# Patient Record
Sex: FEMALE | Race: ASIAN | NOT HISPANIC OR LATINO
[De-identification: names, ages, dates, MRNs, and addresses within clinical notes are randomized per-mention and may not be internally consistent; named-entity substitution may affect disease eponyms.]

---

## 2023-02-15 ENCOUNTER — APPOINTMENT (OUTPATIENT)
Dept: UROLOGY | Facility: CLINIC | Age: 83
End: 2023-02-15
Payer: MEDICARE

## 2023-02-15 VITALS
BODY MASS INDEX: 27.71 KG/M2 | WEIGHT: 132 LBS | HEIGHT: 58 IN | OXYGEN SATURATION: 97 % | TEMPERATURE: 98.1 F | DIASTOLIC BLOOD PRESSURE: 85 MMHG | SYSTOLIC BLOOD PRESSURE: 144 MMHG | HEART RATE: 97 BPM

## 2023-02-15 DIAGNOSIS — Z78.9 OTHER SPECIFIED HEALTH STATUS: ICD-10-CM

## 2023-02-15 DIAGNOSIS — Z87.440 PERSONAL HISTORY OF URINARY (TRACT) INFECTIONS: ICD-10-CM

## 2023-02-15 DIAGNOSIS — R73.03 PREDIABETES.: ICD-10-CM

## 2023-02-15 PROBLEM — Z00.00 ENCOUNTER FOR PREVENTIVE HEALTH EXAMINATION: Status: ACTIVE | Noted: 2023-02-15

## 2023-02-15 PROCEDURE — 99204 OFFICE O/P NEW MOD 45 MIN: CPT

## 2023-02-15 RX ORDER — METOPROLOL TARTRATE 25 MG/1
25 TABLET, FILM COATED ORAL
Refills: 0 | Status: ACTIVE | COMMUNITY

## 2023-02-15 RX ORDER — MECLIZINE HCL 25 MG/1
25 TABLET ORAL
Refills: 0 | Status: ACTIVE | COMMUNITY

## 2023-02-15 RX ORDER — FAMOTIDINE 20 MG/1
20 TABLET, FILM COATED ORAL
Refills: 0 | Status: ACTIVE | COMMUNITY

## 2023-02-15 RX ORDER — LOSARTAN POTASSIUM 100 MG/1
100 TABLET, FILM COATED ORAL
Refills: 0 | Status: ACTIVE | COMMUNITY

## 2023-02-15 NOTE — ASSESSMENT
[FreeTextEntry1] : Ms. Flores is a very pleasant 82 year old woman here today for microscopic hematuria in the setting of a urinary tract infection, urinary urgency and recurrent UTI.\par Primarily Omani speaking; accompanied by family members and wishes for her to translate.\par She reports that a few months ago she went to her PCP for urinary urgency, especially at night.\par Reports that her PCP reported that she had a UTI and prescribed Macrobid.\par Reports that she finished taking it and reported her symptoms returned.\par Reports the PCP re-prescribed Macrobid, and it helped until she stopped taking it.\par This has happened 3 times.\par She reports urgency, frequency and bloating.\par Denies any dysuria or gross hematuria.\par Renal US was done and it was unremarkable with no kidney stones, hydronephrosis or masses.\par Reports low water intake due to her frequency she is nervous about running to the bathroom.\par Denies any personal or family history of kidney stones.\par Denies any family history of urological cancers.\par Denies any history of smoking.\par UC.\par UA.\par Bactrim BID 7 days given concern for urinary tract infection at this time\par We discussed general preventative strategies for urinary tract infections\par Renal ultrasound images from [facility] radiology reviewed demonstrating no kidney stones, hydronephrosis, renal masses\par RTO in 2 weeks.\par

## 2023-02-15 NOTE — HISTORY OF PRESENT ILLNESS
Pt having formed red/brown stools x2 .  MD informed.    [Currently Experiencing ___] :  [unfilled] [Urinary Urgency] : urinary urgency [Urinary Frequency] : urinary frequency [None] : None [FreeTextEntry1] : Ms. Flores is a very pleasant 82 year old woman here today for microscopic hematuria, urinary urgency and recurrent UTI.\par Primarily Cameroonian speaking; accompanied by family members and wishes for her to translate.\par She reports that a few months ago she went to her PCP for urinary urgency, especially at night.\par Reports that her PCP reported that she had a UTI and prescribed Macrobid.\par Reports that she finished taking it and reported her symptoms returned.\par Reports the PCP re-prescribed Macrobid, and it helped until she stopped taking it.\par This has happened 3 times.\par She reports urgency, frequency and bloating.\par Denies any dysuria or gross hematuria.\par Renal US was done and it was unremarkable with no kidney stones, hydronephrosis or masses.\par Reports low water intake due to her frequency she is nervous about running to the bathroom.\par Denies any personal or family history of kidney stones.\par Denies any family history of urological cancers.\par Denies any history of smoking.\par \par

## 2023-02-23 ENCOUNTER — NON-APPOINTMENT (OUTPATIENT)
Age: 83
End: 2023-02-23

## 2023-03-10 ENCOUNTER — APPOINTMENT (OUTPATIENT)
Dept: UROLOGY | Facility: CLINIC | Age: 83
End: 2023-03-10
Payer: MEDICARE

## 2023-03-10 VITALS
HEART RATE: 111 BPM | SYSTOLIC BLOOD PRESSURE: 147 MMHG | TEMPERATURE: 97.9 F | BODY MASS INDEX: 27.71 KG/M2 | WEIGHT: 132 LBS | DIASTOLIC BLOOD PRESSURE: 76 MMHG | OXYGEN SATURATION: 97 % | HEIGHT: 58 IN

## 2023-03-10 PROCEDURE — 99214 OFFICE O/P EST MOD 30 MIN: CPT

## 2023-03-10 NOTE — ASSESSMENT
[FreeTextEntry1] : Very pleasant 82-year-old woman who presents for follow-up of microscopic hematuria, recurrent urinary tract infections\par -Urine culture demonstrates a urinary tract infection sensitive to Bactrim which she completed\par -Urinalysis demonstrates 60 red blood cells per high-powered field as well as concern for urinary tract infection\par -Repeat urinalysis and culture today\par -CT urogram for work-up of microscopic hematuria\par -Cytology\par -Cystoscopy\par -We discussed AUA guidelines regarding risk stratification for microscopic hematuria\par -Extensive discussion of the potential etiologies of hematuria, as well as the need to complete full work up for evaluation of cancer or other  sources of hematuria.  Patient understands and wishes to proceed.\par -Follow-up in 2 weeks\par -Continue cranberry supplements

## 2023-03-10 NOTE — HISTORY OF PRESENT ILLNESS
[FreeTextEntry1] : Very pleasant 82 year old woman who presents for follow up of recurrent UTIs and microscopic hematuria. Recent UA demonstrated 60 red blood cells per high-powered field.  Urine culture demonstrated a urinary tract infection.  She was treated with 7 days of Bactrim.  She reports dysuria resolved.  She still reports suprapubic pressure.  No other complaints.

## 2023-04-04 ENCOUNTER — APPOINTMENT (OUTPATIENT)
Dept: UROLOGY | Facility: CLINIC | Age: 83
End: 2023-04-04
Payer: MEDICARE

## 2023-04-04 PROCEDURE — 99214 OFFICE O/P EST MOD 30 MIN: CPT | Mod: 25

## 2023-04-04 PROCEDURE — 52000 CYSTOURETHROSCOPY: CPT

## 2023-04-05 ENCOUNTER — EMERGENCY (EMERGENCY)
Facility: HOSPITAL | Age: 83
LOS: 1 days | Discharge: HOME | End: 2023-04-05
Attending: STUDENT IN AN ORGANIZED HEALTH CARE EDUCATION/TRAINING PROGRAM
Payer: MEDICARE

## 2023-04-05 VITALS
OXYGEN SATURATION: 98 % | WEIGHT: 130.07 LBS | RESPIRATION RATE: 16 BRPM | HEART RATE: 81 BPM | SYSTOLIC BLOOD PRESSURE: 165 MMHG | DIASTOLIC BLOOD PRESSURE: 83 MMHG | TEMPERATURE: 98 F

## 2023-04-05 VITALS
HEART RATE: 74 BPM | OXYGEN SATURATION: 98 % | RESPIRATION RATE: 18 BRPM | DIASTOLIC BLOOD PRESSURE: 74 MMHG | SYSTOLIC BLOOD PRESSURE: 141 MMHG | TEMPERATURE: 98 F

## 2023-04-05 LAB
ALBUMIN SERPL ELPH-MCNC: 3.4 G/DL — LOW (ref 3.5–5)
ALP SERPL-CCNC: 48 U/L — SIGNIFICANT CHANGE UP (ref 40–120)
ALT FLD-CCNC: 12 U/L DA — SIGNIFICANT CHANGE UP (ref 10–60)
ANION GAP SERPL CALC-SCNC: 6 MMOL/L — SIGNIFICANT CHANGE UP (ref 5–17)
APPEARANCE UR: ABNORMAL
APTT BLD: 30.3 SEC — SIGNIFICANT CHANGE UP (ref 27.5–35.5)
AST SERPL-CCNC: 14 U/L — SIGNIFICANT CHANGE UP (ref 10–40)
BACTERIA # UR AUTO: ABNORMAL /HPF
BASOPHILS # BLD AUTO: 0.05 K/UL — SIGNIFICANT CHANGE UP (ref 0–0.2)
BASOPHILS NFR BLD AUTO: 0.8 % — SIGNIFICANT CHANGE UP (ref 0–2)
BILIRUB SERPL-MCNC: 0.5 MG/DL — SIGNIFICANT CHANGE UP (ref 0.2–1.2)
BILIRUB UR-MCNC: NEGATIVE — SIGNIFICANT CHANGE UP
BUN SERPL-MCNC: 15 MG/DL — SIGNIFICANT CHANGE UP (ref 7–18)
CALCIUM SERPL-MCNC: 9.3 MG/DL — SIGNIFICANT CHANGE UP (ref 8.4–10.5)
CHLORIDE SERPL-SCNC: 110 MMOL/L — HIGH (ref 96–108)
CO2 SERPL-SCNC: 27 MMOL/L — SIGNIFICANT CHANGE UP (ref 22–31)
COLOR SPEC: YELLOW — SIGNIFICANT CHANGE UP
COMMENT - URINE: SIGNIFICANT CHANGE UP
CREAT SERPL-MCNC: 0.71 MG/DL — SIGNIFICANT CHANGE UP (ref 0.5–1.3)
DIFF PNL FLD: ABNORMAL
EGFR: 85 ML/MIN/1.73M2 — SIGNIFICANT CHANGE UP
EOSINOPHIL # BLD AUTO: 0.35 K/UL — SIGNIFICANT CHANGE UP (ref 0–0.5)
EOSINOPHIL NFR BLD AUTO: 5.4 % — SIGNIFICANT CHANGE UP (ref 0–6)
EPI CELLS # UR: SIGNIFICANT CHANGE UP /HPF
FLUAV AG NPH QL: SIGNIFICANT CHANGE UP
FLUBV AG NPH QL: SIGNIFICANT CHANGE UP
GLUCOSE SERPL-MCNC: 123 MG/DL — HIGH (ref 70–99)
GLUCOSE UR QL: NEGATIVE — SIGNIFICANT CHANGE UP
HCT VFR BLD CALC: 32.4 % — LOW (ref 34.5–45)
HGB BLD-MCNC: 11.2 G/DL — LOW (ref 11.5–15.5)
IMM GRANULOCYTES NFR BLD AUTO: 0.3 % — SIGNIFICANT CHANGE UP (ref 0–0.9)
INR BLD: 0.99 RATIO — SIGNIFICANT CHANGE UP (ref 0.88–1.16)
KETONES UR-MCNC: NEGATIVE — SIGNIFICANT CHANGE UP
LEUKOCYTE ESTERASE UR-ACNC: ABNORMAL
LYMPHOCYTES # BLD AUTO: 2.29 K/UL — SIGNIFICANT CHANGE UP (ref 1–3.3)
LYMPHOCYTES # BLD AUTO: 35.2 % — SIGNIFICANT CHANGE UP (ref 13–44)
MCHC RBC-ENTMCNC: 32 PG — SIGNIFICANT CHANGE UP (ref 27–34)
MCHC RBC-ENTMCNC: 34.6 GM/DL — SIGNIFICANT CHANGE UP (ref 32–36)
MCV RBC AUTO: 92.6 FL — SIGNIFICANT CHANGE UP (ref 80–100)
MONOCYTES # BLD AUTO: 0.65 K/UL — SIGNIFICANT CHANGE UP (ref 0–0.9)
MONOCYTES NFR BLD AUTO: 10 % — SIGNIFICANT CHANGE UP (ref 2–14)
NEUTROPHILS # BLD AUTO: 3.15 K/UL — SIGNIFICANT CHANGE UP (ref 1.8–7.4)
NEUTROPHILS NFR BLD AUTO: 48.3 % — SIGNIFICANT CHANGE UP (ref 43–77)
NITRITE UR-MCNC: POSITIVE
NRBC # BLD: 0 /100 WBCS — SIGNIFICANT CHANGE UP (ref 0–0)
PH UR: 6 — SIGNIFICANT CHANGE UP (ref 5–8)
PLATELET # BLD AUTO: 297 K/UL — SIGNIFICANT CHANGE UP (ref 150–400)
POTASSIUM SERPL-MCNC: 3.9 MMOL/L — SIGNIFICANT CHANGE UP (ref 3.5–5.3)
POTASSIUM SERPL-SCNC: 3.9 MMOL/L — SIGNIFICANT CHANGE UP (ref 3.5–5.3)
PROT SERPL-MCNC: 7.5 G/DL — SIGNIFICANT CHANGE UP (ref 6–8.3)
PROT UR-MCNC: 100 MG/DL
PROTHROM AB SERPL-ACNC: 11.8 SEC — SIGNIFICANT CHANGE UP (ref 10.5–13.4)
RBC # BLD: 3.5 M/UL — LOW (ref 3.8–5.2)
RBC # FLD: 12 % — SIGNIFICANT CHANGE UP (ref 10.3–14.5)
RBC CASTS # UR COMP ASSIST: ABNORMAL /HPF (ref 0–2)
SARS-COV-2 RNA SPEC QL NAA+PROBE: SIGNIFICANT CHANGE UP
SODIUM SERPL-SCNC: 143 MMOL/L — SIGNIFICANT CHANGE UP (ref 135–145)
SP GR SPEC: 1.01 — SIGNIFICANT CHANGE UP (ref 1.01–1.02)
UROBILINOGEN FLD QL: NEGATIVE — SIGNIFICANT CHANGE UP
WBC # BLD: 6.51 K/UL — SIGNIFICANT CHANGE UP (ref 3.8–10.5)
WBC # FLD AUTO: 6.51 K/UL — SIGNIFICANT CHANGE UP (ref 3.8–10.5)
WBC UR QL: >50 /HPF (ref 0–5)

## 2023-04-05 PROCEDURE — 87040 BLOOD CULTURE FOR BACTERIA: CPT

## 2023-04-05 PROCEDURE — 80053 COMPREHEN METABOLIC PANEL: CPT

## 2023-04-05 PROCEDURE — 99283 EMERGENCY DEPT VISIT LOW MDM: CPT

## 2023-04-05 PROCEDURE — 85610 PROTHROMBIN TIME: CPT

## 2023-04-05 PROCEDURE — 96374 THER/PROPH/DIAG INJ IV PUSH: CPT

## 2023-04-05 PROCEDURE — 87186 SC STD MICRODIL/AGAR DIL: CPT

## 2023-04-05 PROCEDURE — 99284 EMERGENCY DEPT VISIT MOD MDM: CPT | Mod: 25

## 2023-04-05 PROCEDURE — 99285 EMERGENCY DEPT VISIT HI MDM: CPT

## 2023-04-05 PROCEDURE — 87637 SARSCOV2&INF A&B&RSV AMP PRB: CPT

## 2023-04-05 PROCEDURE — 85025 COMPLETE CBC W/AUTO DIFF WBC: CPT

## 2023-04-05 PROCEDURE — 96375 TX/PRO/DX INJ NEW DRUG ADDON: CPT

## 2023-04-05 PROCEDURE — 81001 URINALYSIS AUTO W/SCOPE: CPT

## 2023-04-05 PROCEDURE — 87086 URINE CULTURE/COLONY COUNT: CPT

## 2023-04-05 PROCEDURE — 87077 CULTURE AEROBIC IDENTIFY: CPT

## 2023-04-05 PROCEDURE — 85730 THROMBOPLASTIN TIME PARTIAL: CPT

## 2023-04-05 PROCEDURE — 36415 COLL VENOUS BLD VENIPUNCTURE: CPT

## 2023-04-05 RX ORDER — METRONIDAZOLE 500 MG
500 TABLET ORAL ONCE
Refills: 0 | Status: COMPLETED | OUTPATIENT
Start: 2023-04-05 | End: 2023-04-05

## 2023-04-05 RX ORDER — CIPROFLOXACIN LACTATE 400MG/40ML
1 VIAL (ML) INTRAVENOUS
Qty: 20 | Refills: 0
Start: 2023-04-05 | End: 2023-04-14

## 2023-04-05 RX ORDER — CIPROFLOXACIN LACTATE 400MG/40ML
400 VIAL (ML) INTRAVENOUS ONCE
Refills: 0 | Status: COMPLETED | OUTPATIENT
Start: 2023-04-05 | End: 2023-04-05

## 2023-04-05 RX ORDER — METRONIDAZOLE 500 MG
1 TABLET ORAL
Qty: 30 | Refills: 0
Start: 2023-04-05 | End: 2023-04-14

## 2023-04-05 RX ADMIN — Medication 200 MILLIGRAM(S): at 20:02

## 2023-04-05 RX ADMIN — Medication 100 MILLIGRAM(S): at 21:09

## 2023-04-05 NOTE — ED PROVIDER NOTE - OBJECTIVE STATEMENT
82 y.o w/ pmh of htn presenting with lower abd pain x months. endorses that she was given abx, but has symptoms recurrently. had ct this week showing diverticulitis. sent in by dr cantu, her urologist. per dr cantu, ct show diverticulitis, also noting colovesicular fistula. patient denies fever, n, v, cp, sob.

## 2023-04-05 NOTE — ED PROVIDER NOTE - NSFOLLOWUPINSTRUCTIONS_ED_ALL_ED_FT
Diverticulitis    Diverticulitis is infection or inflammation of small pouches (diverticula) in the colon that form due to a condition called diverticulosis. Diverticula can trap stool (feces) and bacteria, causing infection and inflammation.    Diverticulitis may cause severe stomach pain and diarrhea. It may lead to tissue damage in the colon that causes bleeding or blockage. The diverticula may also burst (rupture) and cause infected stool to enter other areas of the abdomen.    What are the causes?  This condition is caused by stool becoming trapped in the diverticula, which allows bacteria to grow in the diverticula. This leads to inflammation and infection.    What increases the risk?  You are more likely to develop this condition if you have diverticulosis. The risk increases if you:  Are overweight or obese.  Do not get enough exercise.  Drink alcohol.  Use tobacco products.  Eat a diet that has a lot of red meat such as beef, pork, or lamb.  Eat a diet that does not include enough fiber. High-fiber foods include fruits, vegetables, beans, nuts, and whole grains.  Are over 40 years of age.  What are the signs or symptoms?  Symptoms of this condition may include:  Pain and tenderness in the abdomen. The pain is normally located on the left side of the abdomen, but it may occur in other areas.  Fever and chills.  Nausea.  Vomiting.  Cramping.  Bloating.  Changes in bowel routines.  Blood in your stool.  How is this diagnosed?  This condition is diagnosed based on:  Your medical history.  A physical exam.  Tests to make sure there is nothing else causing your condition. These tests may include:  Blood tests.  Urine tests.  CT scan of the abdomen.  How is this treated?  Most cases of this condition are mild and can be treated at home. Treatment may include:  Taking over-the-counter pain medicines.  Following a clear liquid diet.  Taking antibiotic medicines by mouth.  Resting.  More severe cases may need to be treated at a hospital. Treatment may include:  Not eating or drinking.  Taking prescription pain medicine.  Receiving antibiotic medicines through an IV.  Receiving fluids and nutrition through an IV.  Surgery.  When your condition is under control, your health care provider may recommend that you have a colonoscopy. This is an exam to look at the entire large intestine. During the exam, a lubricated, bendable tube is inserted into the anus and then passed into the rectum, colon, and other parts of the large intestine. A colonoscopy can show how severe your diverticula are and whether something else may be causing your symptoms.    Follow these instructions at home:  Medicines    Take over-the-counter and prescription medicines only as told by your health care provider. These include fiber supplements, probiotics, and stool softeners.  If you were prescribed an antibiotic medicine, take it as told by your health care provider. Do not stop taking the antibiotic even if you start to feel better.  Ask your health care provider if the medicine prescribed to you requires you to avoid driving or using machinery.  Eating and drinking      Follow a full liquid diet or another diet as directed by your health care provider.  After your symptoms improve, your health care provider may tell you to change your diet. He or she may recommend that you eat a diet that contains at least 25 grams (25 g) of fiber daily. Fiber makes it easier to pass stool. Healthy sources of fiber include:  Berries. One cup contains 4–8 grams of fiber.  Beans or lentils. One-half cup contains 5–8 grams of fiber.  Green vegetables. One cup contains 4 grams of fiber.  Avoid eating red meat.  General instructions    Do not use any products that contain nicotine or tobacco, such as cigarettes, e-cigarettes, and chewing tobacco. If you need help quitting, ask your health care provider.  Exercise for at least 30 minutes, 3 times each week. You should exercise hard enough to raise your heart rate and break a sweat.  Keep all follow-up visits as told by your health care provider. This is important. You may need to have a colonoscopy.  Contact a health care provider if:  Your pain does not improve.  Your bowel movements do not return to normal.  Get help right away if:  Your pain gets worse.  Your symptoms do not get better with treatment.  Your symptoms suddenly get worse.  You have a fever.  You vomit more than one time.  You have stools that are bloody, black, or tarry.  Summary  Diverticulitis is infection or inflammation of small pouches (diverticula) in the colon that form due to a condition called diverticulosis. Diverticula can trap stool (feces) and bacteria, causing infection and inflammation.  You are at higher risk for this condition if you have diverticulosis and you eat a diet that does not include enough fiber.  Most cases of this condition are mild and can be treated at home. More severe cases may need to be treated at a hospital.  When your condition is under control, your health care provider may recommend that you have an exam called a colonoscopy. This exam can show how severe your diverticula are and whether something else may be causing your symptoms.  Keep all follow-up visits as told by your health care provider. This is important.  This information is not intended to replace advice given to you by your health care provider. Make sure you discuss any questions you have with your health care provider.

## 2023-04-05 NOTE — ED PROVIDER NOTE - PROGRESS NOTE DETAILS
patient evaluated by surg pa, colorectal consulted for colovesicular fistula. lab wnl. patient stable for outpatient f.u. patient given abx, colorectal f.u info and return precaution. clinically stable on dc

## 2023-04-05 NOTE — ED ADULT NURSE NOTE - OBJECTIVE STATEMENT
Pt Daughter stated sent by doctor, recent diverticulitis, frequent infection and UTI, complains of chronic back pain, no difficulty urinating, no complaints

## 2023-04-05 NOTE — ED PROVIDER NOTE - CLINICAL SUMMARY MEDICAL DECISION MAKING FREE TEXT BOX
Patient presenting with diverticulitis and colovesicular junction on ct outpatient. will obtain lab, assess for sepsis. iv abx, surgery consult. ed obs and reassess

## 2023-04-05 NOTE — ED ADULT TRIAGE NOTE - CHIEF COMPLAINT QUOTE
sent by PMD for admission -  diverticulitis, lower abdominal pressure, denies any nausea/ vomiting/ diarrhea

## 2023-04-05 NOTE — CONSULT NOTE ADULT - ASSESSMENT
82y.o. Female with acute diverticulitis on imaging, colovesical fistula    -Recommend abx course  -Outpt f/u with Dr. Goncalves after completion of abx for surgery planning  -Diet as tolerated  -Dr. Chavarria aware of plan

## 2023-04-05 NOTE — ED PROVIDER NOTE - CARE PROVIDER_API CALL
Charlee Goncalves (MD)  ColonRectal Surgery; Surgery  [address], [address]  [place] [address]  Phone: [phone]  Fax: [phone]  Follow Up Time:

## 2023-04-05 NOTE — ED PROVIDER NOTE - PATIENT PORTAL LINK FT
You can access the FollowMyHealth Patient Portal offered by [facility] by registering at the following website: [email or web address]. By joining [facility]’s FollowMyHealth portal, you will also be able to view your health information using other applications (apps) compatible with our system.

## 2023-04-05 NOTE — CONSULT NOTE ADULT - SUBJECTIVE AND OBJECTIVE BOX
Patient is a 82y old  Female who presents with a chief complaint of     HPI  Called see and eval 82y.o. Female w/PMH significant for HTN for colovesical fistula. Daughter opted to translate as well as give medical history. Pt sent in by urologist Dr. Chavarria after CT abd/pelvis results from [facility] showing diverticulitis with colovesical fistula. Pt has been c/o increased frequency since September, worse at night. Pt saw her PMD 3 times, UA showing blood in urine, each with prescription for increased abx course length. Each time pt would have brief relief of symptoms but frequency returns. Pt was referred to Dr. Chavarria. UA again showing blood in urine. Pt was referred to [facility] for CT abd/pelvis last Friday. While waiting for results, pt saw Dr. Chavarria yesterday and had cystoscopy performed in office which showed a fistulous tract in bladder. CT results reported to Dr. Chavarria, showing colovesical fistula with acute diverticulitis. Pt instructed to come to [facility] ER for tx. Pt currently without complaints. Tolerating diet, +flatus/BM. Denies fever, chills, abd pain, constipation. Last colonoscopy in 2023. Daughter states doctor found some tic but biopsy was negative. Pt states about 10-15 years ago she had L sided abd pain. Pt thought it might have been kidney stone. Pt was given IV abx and symptoms resolved. Does not recall passing kidney stone. Daughter notes that pt had episode of palpitation in the past. Pt was admitted to [facility] but nothing was found to be abnormal during work up and pt was discharged.     PAST MEDICAL & SURGICAL HISTORY:  HTN    UTI    b/l knee replacements    MEDICATIONS  (STANDING):    MEDICATIONS  (PRN):      Allergies    No Known Allergies    Intolerances    Vital Signs Last 24 Hrs  T(C): 36.8 (2023 21:29), Max: 36.8 (2023 17:17)  T(F): 98.2 (2023 21:29), Max: 98.3 (2023 17:17)  HR: 74 (2023 21:29) (74 - 81)  BP: 141/74 (2023 21:29) (141/74 - 165/83)  BP(mean): --  RR: 18 (2023 21:29) (16 - 18)  SpO2: 98% (2023 21:29) (98% - 98%)    Parameters below as of 2023 21:29  Patient On (Oxygen Delivery Method): room air    Physical:  Gen: A&Ox3. NAD  Abd: Soft ND, NT    LABS:                        11.2   6.51  )-----------( 297      ( 2023 19:00 )             32.4              04-05    143  |  110<H>  |  15  ----------------------------<  123<H>  3.9   |  27  |  0.71    Ca    9.3      2023 19:00    TPro  7.5  /  Alb  3.4<L>  /  TBili  0.5  /  DBili  x   /  AST  14  /  ALT  12  /  AlkPhos  48  04-05            PT/INR - ( 2023 19:00 )   PT: 11.8 sec;   INR: 0.99 ratio      PTT - ( 2023 19:00 )  PTT:30.3 sec  Urinalysis Basic - ( 2023 20:05 )    Color: Yellow / Appearance: very cloudy / S.010 / pH: x  Gluc: x / Ketone: Negative  / Bili: Negative / Urobili: Negative   Blood: x / Protein: 100 mg/dL / Nitrite: Positive   Leuk Esterase: Moderate / RBC: 25-50 /HPF / WBC >50 /HPF   Sq Epi: x / Non Sq Epi: x / Bacteria: TNTC /HPF

## 2023-04-07 LAB
APPEARANCE: ABNORMAL
BACTERIA UR CULT: ABNORMAL
BACTERIA: NEGATIVE
BILIRUBIN URINE: NEGATIVE
BLOOD URINE: ABNORMAL
COLOR: NORMAL
GLUCOSE QUALITATIVE U: NEGATIVE
HYALINE CASTS: 0 /LPF
KETONES URINE: NEGATIVE
LEUKOCYTE ESTERASE URINE: ABNORMAL
MICROSCOPIC-UA: NORMAL
NITRITE URINE: NEGATIVE
PH URINE: 7.5
PROTEIN URINE: NEGATIVE
RED BLOOD CELLS URINE: 20 /HPF
SPECIFIC GRAVITY URINE: 1
SQUAMOUS EPITHELIAL CELLS: 16 /HPF
URINE CYTOLOGY: NORMAL
UROBILINOGEN URINE: NORMAL
WHITE BLOOD CELLS URINE: 16 /HPF

## 2023-04-08 LAB
-  AMIKACIN: SIGNIFICANT CHANGE UP
-  AMIKACIN: SIGNIFICANT CHANGE UP
-  AMOXICILLIN/CLAVULANIC ACID: SIGNIFICANT CHANGE UP
-  AMOXICILLIN/CLAVULANIC ACID: SIGNIFICANT CHANGE UP
-  AMPICILLIN/SULBACTAM: SIGNIFICANT CHANGE UP
-  AMPICILLIN/SULBACTAM: SIGNIFICANT CHANGE UP
-  AMPICILLIN: SIGNIFICANT CHANGE UP
-  AMPICILLIN: SIGNIFICANT CHANGE UP
-  AZTREONAM: SIGNIFICANT CHANGE UP
-  AZTREONAM: SIGNIFICANT CHANGE UP
-  CEFAZOLIN: SIGNIFICANT CHANGE UP
-  CEFAZOLIN: SIGNIFICANT CHANGE UP
-  CEFEPIME: SIGNIFICANT CHANGE UP
-  CEFEPIME: SIGNIFICANT CHANGE UP
-  CEFOXITIN: SIGNIFICANT CHANGE UP
-  CEFOXITIN: SIGNIFICANT CHANGE UP
-  CEFTRIAXONE: SIGNIFICANT CHANGE UP
-  CEFTRIAXONE: SIGNIFICANT CHANGE UP
-  CEFUROXIME: SIGNIFICANT CHANGE UP
-  CIPROFLOXACIN: SIGNIFICANT CHANGE UP
-  CIPROFLOXACIN: SIGNIFICANT CHANGE UP
-  ERTAPENEM: SIGNIFICANT CHANGE UP
-  ERTAPENEM: SIGNIFICANT CHANGE UP
-  GENTAMICIN: SIGNIFICANT CHANGE UP
-  GENTAMICIN: SIGNIFICANT CHANGE UP
-  IMIPENEM: SIGNIFICANT CHANGE UP
-  IMIPENEM: SIGNIFICANT CHANGE UP
-  LEVOFLOXACIN: SIGNIFICANT CHANGE UP
-  LEVOFLOXACIN: SIGNIFICANT CHANGE UP
-  MEROPENEM: SIGNIFICANT CHANGE UP
-  MEROPENEM: SIGNIFICANT CHANGE UP
-  NITROFURANTOIN: SIGNIFICANT CHANGE UP
-  NITROFURANTOIN: SIGNIFICANT CHANGE UP
-  PIPERACILLIN/TAZOBACTAM: SIGNIFICANT CHANGE UP
-  PIPERACILLIN/TAZOBACTAM: SIGNIFICANT CHANGE UP
-  TOBRAMYCIN: SIGNIFICANT CHANGE UP
-  TOBRAMYCIN: SIGNIFICANT CHANGE UP
-  TRIMETHOPRIM/SULFAMETHOXAZOLE: SIGNIFICANT CHANGE UP
-  TRIMETHOPRIM/SULFAMETHOXAZOLE: SIGNIFICANT CHANGE UP
CULTURE RESULTS: SIGNIFICANT CHANGE UP
METHOD TYPE: SIGNIFICANT CHANGE UP
METHOD TYPE: SIGNIFICANT CHANGE UP
ORGANISM # SPEC MICROSCOPIC CNT: SIGNIFICANT CHANGE UP
SPECIMEN SOURCE: SIGNIFICANT CHANGE UP

## 2023-04-10 LAB
CULTURE RESULTS: SIGNIFICANT CHANGE UP
CULTURE RESULTS: SIGNIFICANT CHANGE UP
SPECIMEN SOURCE: SIGNIFICANT CHANGE UP
SPECIMEN SOURCE: SIGNIFICANT CHANGE UP

## 2023-04-11 ENCOUNTER — APPOINTMENT (OUTPATIENT)
Dept: UROLOGY | Facility: CLINIC | Age: 83
End: 2023-04-11

## 2023-04-13 LAB
APPEARANCE: ABNORMAL
BACTERIA: ABNORMAL
BILIRUBIN URINE: NEGATIVE
BLOOD URINE: ABNORMAL
COLOR: YELLOW
GLUCOSE QUALITATIVE U: NEGATIVE
HYALINE CASTS: 5 /LPF
KETONES URINE: NEGATIVE
LEUKOCYTE ESTERASE URINE: ABNORMAL
MICROSCOPIC-UA: NORMAL
NITRITE URINE: POSITIVE
PH URINE: 7
PROTEIN URINE: ABNORMAL
RED BLOOD CELLS URINE: 60 /HPF
SPECIFIC GRAVITY URINE: 1.02
SQUAMOUS EPITHELIAL CELLS: 1 /HPF
UROBILINOGEN URINE: NORMAL
WHITE BLOOD CELLS URINE: 104 /HPF

## 2023-04-18 ENCOUNTER — APPOINTMENT (OUTPATIENT)
Dept: SURGERY | Facility: CLINIC | Age: 83
End: 2023-04-18
Payer: MEDICARE

## 2023-04-18 VITALS
BODY MASS INDEX: 28.91 KG/M2 | HEART RATE: 77 BPM | HEIGHT: 57 IN | WEIGHT: 134 LBS | DIASTOLIC BLOOD PRESSURE: 77 MMHG | SYSTOLIC BLOOD PRESSURE: 152 MMHG

## 2023-04-18 DIAGNOSIS — Z63.4 DISAPPEARANCE AND DEATH OF FAMILY MEMBER: ICD-10-CM

## 2023-04-18 DIAGNOSIS — Z86.79 PERSONAL HISTORY OF OTHER DISEASES OF THE CIRCULATORY SYSTEM: ICD-10-CM

## 2023-04-18 DIAGNOSIS — K57.32 DIVERTICULITIS OF LARGE INTESTINE W/OUT PERFORATION OR ABSCESS W/OUT BLEEDING: ICD-10-CM

## 2023-04-18 PROCEDURE — 99213 OFFICE O/P EST LOW 20 MIN: CPT

## 2023-04-18 RX ORDER — NITROFURANTOIN MACROCRYSTALS 100 MG/1
100 CAPSULE ORAL
Refills: 0 | Status: COMPLETED | COMMUNITY
End: 2023-04-18

## 2023-04-18 RX ORDER — SULFAMETHOXAZOLE AND TRIMETHOPRIM 800; 160 MG/1; MG/1
800-160 TABLET ORAL
Qty: 14 | Refills: 0 | Status: COMPLETED | COMMUNITY
Start: 2023-02-15 | End: 2023-04-18

## 2023-04-18 RX ORDER — METRONIDAZOLE 500 MG/1
500 TABLET ORAL
Qty: 48 | Refills: 0 | Status: ACTIVE | COMMUNITY
Start: 2023-04-18 | End: 1900-01-01

## 2023-04-18 RX ORDER — CIPROFLOXACIN HYDROCHLORIDE 500 MG/1
500 TABLET, FILM COATED ORAL
Qty: 32 | Refills: 0 | Status: ACTIVE | COMMUNITY
Start: 2023-04-18 | End: 1900-01-01

## 2023-04-18 SDOH — SOCIAL STABILITY - SOCIAL INSECURITY: DISSAPEARANCE AND DEATH OF FAMILY MEMBER: Z63.4

## 2023-04-23 NOTE — PHYSICAL EXAM
[Exam Deferred] : exam was deferred [Normal Breath Sounds] : Normal breath sounds [Normal Heart Sounds] : normal heart sounds [Normal Rate and Rhythm] : normal rate and rhythm [No Rash or Lesion] : No rash or lesion [Alert] : alert [Oriented to Person] : oriented to person [Oriented to Place] : oriented to place [Oriented to Time] : oriented to time [JVD] : no jugular venous distention  [Wheezing] : no wheezing was heard [de-identified] : soft, non-distended, non-tender to palpation.  [de-identified] : awake, alert, NAD  [de-identified] : normocephalic, atraumatic, EOMI, normal conjunctiva  [de-identified] : b/l chest rise, EWOB on RA  [de-identified] : deferred [de-identified] : b/l knee healed incisions [de-identified] : requires some assistance [de-identified] : normal mood and affect

## 2023-04-23 NOTE — ASSESSMENT
[FreeTextEntry1] : Ms. ANNE SILVA  is a 82 year F referred by Dr. Chavarria for complicated diverticulitis with colovesical fistula here for an initial visit.

## 2023-04-23 NOTE — REVIEW OF SYSTEMS
[As Noted in HPI] : as noted in HPI [Negative] : Neurological [Fever] : no fever [Chills] : no chills [Feeling Poorly] : not feeling poorly [Feeling Tired] : not feeling tired [Eye Pain] : no eye pain [Earache] : no earache [Chest Pain] : no chest pain [Cough] : no cough [Vomiting] : no vomiting [Abdominal Pain] : no abdominal pain [Constipation] : no constipation [Diarrhea] : no diarrhea [FreeTextEntry3] : wears eyeglasses [FreeTextEntry7] : diverticulosis  [FreeTextEntry9] : uses a cane, B/L knee surgery, back pain [FreeTextEntry8] : recurrent UTIs, microhematuria

## 2023-04-23 NOTE — HISTORY OF PRESENT ILLNESS
[FreeTextEntry1] : Ms. ANNE SILVA is a St Helenian speaking 82 year F (daughter translated) with a history of recurrent UTI and microhematuria referred by Dr. Chavarria for colovesical fistula likely due to complicated diverticulitis here for an initial visit. Patient initially had urinary frequency and nocturia for which her PCP sent urine studies confirming a UTI and prescribed antibiotics (1st time 3days). When she had recurrent sxs off the abx her PCP prescribed it for longer but she still had recurrent sxs off antibiotics at least 2 more times despite treatment with various abx. Thus her PCP referred her to Dr. Chavarria who sent her for imaging. A CT A/P was done at [facility] and which revealed sigmoid diverticulitis inseparable from the urinary bladder with gas bubbles within the urinary bladder suggesting a colovesical fistula. There is also a sessile polyp mass at the base of the urinary bladder and recommend a cystoscopy. He performed a cystoscopy which looked consistent with a fistula as well. She was advised to go to the ED for her diverticulitis though she denies any abdominal pain. As she was not having pain, tolerating a diet, having bowel function without any signs of sepsis she was discharged home with PO Cipro/flagyl (completed the course yesterday) from the ED. She has bloating and sometimes sees fecal material in her urine. She has daily BMs. Currently she denies dysuria, abdominal pain or fevers/chills. Denies taking fiber supplement, stool softeners and laxatives. Last colonoscopy done 11/2022 revealed diverticulosis. Normal for race

## 2023-04-28 ENCOUNTER — OUTPATIENT (OUTPATIENT)
Dept: OUTPATIENT SERVICES | Facility: HOSPITAL | Age: 83
LOS: 1 days | End: 2023-04-28
Payer: MEDICARE

## 2023-04-28 VITALS
HEART RATE: 82 BPM | HEIGHT: 59 IN | OXYGEN SATURATION: 98 % | DIASTOLIC BLOOD PRESSURE: 74 MMHG | RESPIRATION RATE: 16 BRPM | TEMPERATURE: 98 F | SYSTOLIC BLOOD PRESSURE: 147 MMHG | WEIGHT: 132.06 LBS

## 2023-04-28 DIAGNOSIS — I10 ESSENTIAL (PRIMARY) HYPERTENSION: ICD-10-CM

## 2023-04-28 DIAGNOSIS — Z96.651 PRESENCE OF RIGHT ARTIFICIAL KNEE JOINT: Chronic | ICD-10-CM

## 2023-04-28 DIAGNOSIS — Z96.652 PRESENCE OF LEFT ARTIFICIAL KNEE JOINT: Chronic | ICD-10-CM

## 2023-04-28 DIAGNOSIS — N32.1 VESICOINTESTINAL FISTULA: ICD-10-CM

## 2023-04-28 DIAGNOSIS — Z01.818 ENCOUNTER FOR OTHER PREPROCEDURAL EXAMINATION: ICD-10-CM

## 2023-04-28 DIAGNOSIS — K57.32 DIVERTICULITIS OF LARGE INTESTINE WITHOUT PERFORATION OR ABSCESS WITHOUT BLEEDING: ICD-10-CM

## 2023-04-28 LAB
A1C WITH ESTIMATED AVERAGE GLUCOSE RESULT: 6.2 % — HIGH (ref 4–5.6)
BLD GP AB SCN SERPL QL: SIGNIFICANT CHANGE UP
ESTIMATED AVERAGE GLUCOSE: 131 MG/DL — HIGH (ref 68–114)

## 2023-04-28 PROCEDURE — G0463: CPT

## 2023-04-28 RX ORDER — SODIUM CHLORIDE 9 MG/ML
3 INJECTION INTRAMUSCULAR; INTRAVENOUS; SUBCUTANEOUS EVERY 8 HOURS
Refills: 0 | Status: DISCONTINUED | OUTPATIENT
Start: 2023-05-03 | End: 2023-05-08

## 2023-04-28 NOTE — H&P PST ADULT - REASON FOR ADMISSION
Robotic Assisted Anterior Resection with Takedown of Colovesical Fistula, Flexible Sigmoidoscopy Possible Ostomy Possible Open on 5/3/23 with Dr. Goncalves

## 2023-04-28 NOTE — H&P PST ADULT - PROBLEM SELECTOR PLAN 2
Pt instructed to take her losartan and Metoprolol as prescribed including taking on morning of surgery

## 2023-04-28 NOTE — H&P PST ADULT - PROBLEM SELECTOR PLAN 1
Pt schedule for Robotic Assisted Anterior Resection with Takedown of Colovesical Fistula, Flexible Sigmoidoscopy   Possible Ostomy Possible Open on 5/3/23 with Dr. Goncalves     Labs and EKG done by PCP 4/21/23- results in chart   Pt was seen by PCP for Medical clearance -report  in chart    Pt was  instructed to stop aspirin/ecotrin and all over the counter medication including vitamins and herbal supplements one week prior to surgery   Instructions given on the use of 4% chlorhexidine wash and Pt verbalized understanding of same   Pt Instructed to have nothing by mouth starting midnight day before surgery  Patient is to expect a phone call day before surgery between the hours of 430- 630pm giving arrival time for surgery   Written and verbal preoperative instructions given to patient with understanding verbalized via daughter as  as requested     Patient today with STOP bang score 2  Low  risk for JULIETA

## 2023-04-28 NOTE — H&P PST ADULT - NSANTHTOTALSCORECAL_ENT_A_CORE
aspirin 81 mg oral tablet: 1 tab(s) orally once a day  atorvastatin 40 mg oral tablet: 1 tab(s) orally once a day (at bedtime)  Brilinta (ticagrelor) 90 mg oral tablet: 1 tab(s) orally 2 times a day  calcium acetate 667 mg oral capsule: 1 cap(s) orally once a day  Lasix 40 mg oral tablet: 1 tab(s) orally 2 times a day   metoprolol succinate 50 mg oral tablet, extended release: 1 tab(s) orally once a day  predniSONE 10 mg oral tablet: 1 tab(s) orally once a day   2

## 2023-04-28 NOTE — H&P PST ADULT - NSICDXPROCEDURE_GEN_ALL_CORE_FT
PROCEDURES:  Laparoscopic colectomy, partial, with coloproctostomy 28-Apr-2023 12:35:24  Satish Patel  Closure of enterovesical fistula 28-Apr-2023 12:36:13  Satish Patel  Flexible sigmoidoscopy 28-Apr-2023 12:37:15  Satish Patel

## 2023-04-28 NOTE — H&P PST ADULT - NSICDXPASTMEDICALHX_GEN_ALL_CORE_FT
PAST MEDICAL HISTORY:  HTN (hypertension)     Vertigo      PAST MEDICAL HISTORY:  Diverticulitis     HTN (hypertension)     Vertigo     Vesicointestinal fistula

## 2023-04-28 NOTE — H&P PST ADULT - NS ATTEND AMEND GEN_ALL_CORE FT
OR today for robotic (possible laparoscopic, possible open) anterior resection, takedown of colovesical fistula, flex sig, possible ostomy  lithotomy w/ arms tucked  general and spinal duramorph  seay  0.5% marcaine and exparel 1:1    Charlee Goncalves MD  Attending Physician

## 2023-04-28 NOTE — H&P PST ADULT - ASSESSMENT
82 y.o female with Diverticulitis and Vesicointestinal fistula Now for schedule Robotic Assisted Anterior Resection with Takedown of Colovesical Fistula, Flexible Sigmoidoscopy   Possible Ostomy Possible Open on 5/3/23 with Dr. Ivanna BAKER 2

## 2023-04-28 NOTE — H&P PST ADULT - HISTORY OF PRESENT ILLNESS
Robotic Assisted Anterior Resection with Takedown of Colovesical Fistula, Flexible Sigmoidoscopy Possible Ostomy Possible Open on 5/3/23 with Dr. Goncalves     c/o incontinence on w/u found to have fistula  82 y.o female with PMHx Vertigo and HTN, c/o urinary incontinence and abdominal pain. On w/u found to have Diverticulitis and Vesicointestinal fistula.   Now presents for presurgical testing for schedule Robotic Assisted Anterior Resection with Takedown of Colovesical Fistula, Flexible Sigmoidoscopy   Possible Ostomy Possible Open on 5/3/23 with Dr. Goncalves

## 2023-04-28 NOTE — H&P PST ADULT - PATIENT'S PREFERRED PRONOUN
Niacinamide Counseling: I recommended taking niacin or niacinamide, also know as vitamin B3, twice daily. Recent evidence suggests that taking vitamin B3 (500 mg twice daily) can reduce the risk of actinic keratoses and non-melanoma skin cancers. Side effects of vitamin B3 include flushing and headache. Her/She

## 2023-05-01 LAB — SARS-COV-2 N GENE NPH QL NAA+PROBE: NOT DETECTED

## 2023-05-02 ENCOUNTER — TRANSCRIPTION ENCOUNTER (OUTPATIENT)
Age: 83
End: 2023-05-02

## 2023-05-03 ENCOUNTER — APPOINTMENT (OUTPATIENT)
Dept: SURGERY | Facility: HOSPITAL | Age: 83
End: 2023-05-03
Payer: MEDICARE

## 2023-05-03 ENCOUNTER — RESULT REVIEW (OUTPATIENT)
Age: 83
End: 2023-05-03

## 2023-05-03 ENCOUNTER — INPATIENT (INPATIENT)
Facility: HOSPITAL | Age: 83
LOS: 4 days | Discharge: HOME | DRG: 329 | End: 2023-05-08
Attending: STUDENT IN AN ORGANIZED HEALTH CARE EDUCATION/TRAINING PROGRAM | Admitting: STUDENT IN AN ORGANIZED HEALTH CARE EDUCATION/TRAINING PROGRAM
Payer: MEDICARE

## 2023-05-03 VITALS
HEART RATE: 96 BPM | HEIGHT: 59 IN | SYSTOLIC BLOOD PRESSURE: 131 MMHG | DIASTOLIC BLOOD PRESSURE: 65 MMHG | OXYGEN SATURATION: 98 % | RESPIRATION RATE: 16 BRPM | TEMPERATURE: 97 F | WEIGHT: 132.06 LBS

## 2023-05-03 DIAGNOSIS — Z96.652 PRESENCE OF LEFT ARTIFICIAL KNEE JOINT: Chronic | ICD-10-CM

## 2023-05-03 DIAGNOSIS — Z96.651 PRESENCE OF RIGHT ARTIFICIAL KNEE JOINT: Chronic | ICD-10-CM

## 2023-05-03 DIAGNOSIS — N32.1 VESICOINTESTINAL FISTULA: ICD-10-CM

## 2023-05-03 LAB
BLD GP AB SCN SERPL QL: SIGNIFICANT CHANGE UP
GLUCOSE BLDC GLUCOMTR-MCNC: 117 MG/DL — HIGH (ref 70–99)
GLUCOSE BLDC GLUCOMTR-MCNC: 175 MG/DL — HIGH (ref 70–99)
GLUCOSE BLDC GLUCOMTR-MCNC: 292 MG/DL — HIGH (ref 70–99)

## 2023-05-03 PROCEDURE — 50949 UNLISTED LAPS PX URETER: CPT | Mod: LT,59

## 2023-05-03 PROCEDURE — 88307 TISSUE EXAM BY PATHOLOGIST: CPT | Mod: 26

## 2023-05-03 PROCEDURE — 45399 UNLISTED PROCEDURE COLON: CPT

## 2023-05-03 PROCEDURE — 88304 TISSUE EXAM BY PATHOLOGIST: CPT | Mod: 26

## 2023-05-03 PROCEDURE — S2900 ROBOTIC SURGICAL SYSTEM: CPT | Mod: NC

## 2023-05-03 PROCEDURE — 45330 DIAGNOSTIC SIGMOIDOSCOPY: CPT

## 2023-05-03 DEVICE — TISSEEL 10ML: Type: IMPLANTABLE DEVICE | Status: FUNCTIONAL

## 2023-05-03 DEVICE — STAPLER ECHELON CIRCULAR POWERED 29MM: Type: IMPLANTABLE DEVICE | Status: FUNCTIONAL

## 2023-05-03 DEVICE — STAPLER COVIDIEN PURSTRING 65MM: Type: IMPLANTABLE DEVICE | Status: FUNCTIONAL

## 2023-05-03 DEVICE — XI STAPLER SUREFORM RELOAD 60 BLUE: Type: IMPLANTABLE DEVICE | Status: FUNCTIONAL

## 2023-05-03 RX ORDER — ENOXAPARIN SODIUM 100 MG/ML
40 INJECTION SUBCUTANEOUS EVERY 24 HOURS
Refills: 0 | Status: DISCONTINUED | OUTPATIENT
Start: 2023-05-04 | End: 2023-05-08

## 2023-05-03 RX ORDER — ONDANSETRON 8 MG/1
4 TABLET, FILM COATED ORAL EVERY 6 HOURS
Refills: 0 | Status: DISCONTINUED | OUTPATIENT
Start: 2023-05-03 | End: 2023-05-08

## 2023-05-03 RX ORDER — LOSARTAN POTASSIUM 100 MG/1
1 TABLET, FILM COATED ORAL
Refills: 0 | DISCHARGE

## 2023-05-03 RX ORDER — CHLORHEXIDINE GLUCONATE 213 G/1000ML
1 SOLUTION TOPICAL ONCE
Refills: 0 | Status: COMPLETED | OUTPATIENT
Start: 2023-05-03 | End: 2023-05-03

## 2023-05-03 RX ORDER — ONDANSETRON 8 MG/1
4 TABLET, FILM COATED ORAL ONCE
Refills: 0 | Status: DISCONTINUED | OUTPATIENT
Start: 2023-05-03 | End: 2023-05-03

## 2023-05-03 RX ORDER — OXYCODONE HYDROCHLORIDE 5 MG/1
2.5 TABLET ORAL EVERY 4 HOURS
Refills: 0 | Status: DISCONTINUED | OUTPATIENT
Start: 2023-05-03 | End: 2023-05-08

## 2023-05-03 RX ORDER — SODIUM CHLORIDE 9 MG/ML
1000 INJECTION, SOLUTION INTRAVENOUS
Refills: 0 | Status: DISCONTINUED | OUTPATIENT
Start: 2023-05-03 | End: 2023-05-04

## 2023-05-03 RX ORDER — FENTANYL CITRATE 50 UG/ML
12.5 INJECTION INTRAVENOUS
Refills: 0 | Status: DISCONTINUED | OUTPATIENT
Start: 2023-05-03 | End: 2023-05-03

## 2023-05-03 RX ORDER — NALOXONE HYDROCHLORIDE 4 MG/.1ML
0.1 SPRAY NASAL
Refills: 0 | Status: DISCONTINUED | OUTPATIENT
Start: 2023-05-03 | End: 2023-05-08

## 2023-05-03 RX ORDER — PANTOPRAZOLE SODIUM 20 MG/1
40 TABLET, DELAYED RELEASE ORAL DAILY
Refills: 0 | Status: DISCONTINUED | OUTPATIENT
Start: 2023-05-03 | End: 2023-05-08

## 2023-05-03 RX ORDER — METOPROLOL TARTRATE 50 MG
50 TABLET ORAL DAILY
Refills: 0 | Status: DISCONTINUED | OUTPATIENT
Start: 2023-05-03 | End: 2023-05-08

## 2023-05-03 RX ORDER — ACETAMINOPHEN 500 MG
1000 TABLET ORAL EVERY 6 HOURS
Refills: 0 | Status: COMPLETED | OUTPATIENT
Start: 2023-05-03 | End: 2023-05-04

## 2023-05-03 RX ORDER — FENTANYL CITRATE 50 UG/ML
25 INJECTION INTRAVENOUS
Refills: 0 | Status: DISCONTINUED | OUTPATIENT
Start: 2023-05-03 | End: 2023-05-03

## 2023-05-03 RX ORDER — MECLIZINE HCL 12.5 MG
1 TABLET ORAL
Refills: 0 | DISCHARGE

## 2023-05-03 RX ORDER — OXYCODONE HYDROCHLORIDE 5 MG/1
5 TABLET ORAL EVERY 4 HOURS
Refills: 0 | Status: DISCONTINUED | OUTPATIENT
Start: 2023-05-03 | End: 2023-05-08

## 2023-05-03 RX ADMIN — SODIUM CHLORIDE 40 MILLILITER(S): 9 INJECTION, SOLUTION INTRAVENOUS at 22:00

## 2023-05-03 RX ADMIN — CHLORHEXIDINE GLUCONATE 1 APPLICATION(S): 213 SOLUTION TOPICAL at 13:15

## 2023-05-03 RX ADMIN — SODIUM CHLORIDE 3 MILLILITER(S): 9 INJECTION INTRAMUSCULAR; INTRAVENOUS; SUBCUTANEOUS at 21:43

## 2023-05-03 NOTE — PATIENT PROFILE ADULT - OVER THE PAST TWO WEEKS HAVE YOU FELT DOWN, DEPRESSED OR HOPELESS?
Nexplanon Procedure Note    16 yo G0 presents for Nexplanon placement. She reports LMP was 11/24/17.    Consent was reviewed and signed prior to insertion.     Insertion site is identified at the inner, upper aspect of the left arm. Swabbed x1 with Betadine. 1cc of lidocaine with epinephrine is injected at insertion site. Nexplanon is inserted without problems. Patient tolerated the procedure well. She was able to palpate the device.  Pressure bandage was applied.She was given instructions to keep the pressure bandage in place for 12 hours and watch for signs of infection.    Lot# [id number]  Exp: 03/2020  Marcos DUARTE present for entire procedure    Taty Cobos D.O.  Department of Obstetrics and Gynecology   no

## 2023-05-03 NOTE — PATIENT PROFILE ADULT - INTERPRETATION SERVICES DECLINED
pt. requested her daughter to translate preop in ASU/Patient/Caregiver requests family/friend to interpret.

## 2023-05-03 NOTE — BRIEF OPERATIVE NOTE - OPERATION/FINDINGS
Robotic assisted laparoscopic low anterior resection. Medial to lateral mobilization of rectosigmoid with intracorporeal ligation of SHAYNA. Identification and protection of bilateral ureters throughout case. Lateral to medial mobilization of descending colon along white line of Toldt. Splenic flexure takedown. End to end anastomosis with no leak on air insufflation test, anastomosis 7 cm from AV. Pfizer dose 1 and 2

## 2023-05-03 NOTE — PATIENT PROFILE ADULT - FALL HARM RISK - HARM RISK INTERVENTIONS

## 2023-05-04 LAB
ANION GAP SERPL CALC-SCNC: 10 MMOL/L — SIGNIFICANT CHANGE UP (ref 5–17)
BUN SERPL-MCNC: 22 MG/DL — HIGH (ref 7–18)
CALCIUM SERPL-MCNC: 8.7 MG/DL — SIGNIFICANT CHANGE UP (ref 8.4–10.5)
CHLORIDE SERPL-SCNC: 109 MMOL/L — HIGH (ref 96–108)
CO2 SERPL-SCNC: 19 MMOL/L — LOW (ref 22–31)
CREAT SERPL-MCNC: 1.22 MG/DL — SIGNIFICANT CHANGE UP (ref 0.5–1.3)
EGFR: 45 ML/MIN/1.73M2 — LOW
GLUCOSE SERPL-MCNC: 175 MG/DL — HIGH (ref 70–99)
HCT VFR BLD CALC: 34.5 % — SIGNIFICANT CHANGE UP (ref 34.5–45)
HGB BLD-MCNC: 11.3 G/DL — LOW (ref 11.5–15.5)
MAGNESIUM SERPL-MCNC: 1.8 MG/DL — SIGNIFICANT CHANGE UP (ref 1.6–2.6)
MCHC RBC-ENTMCNC: 31.9 PG — SIGNIFICANT CHANGE UP (ref 27–34)
MCHC RBC-ENTMCNC: 32.8 GM/DL — SIGNIFICANT CHANGE UP (ref 32–36)
MCV RBC AUTO: 97.5 FL — SIGNIFICANT CHANGE UP (ref 80–100)
NRBC # BLD: 0 /100 WBCS — SIGNIFICANT CHANGE UP (ref 0–0)
PHOSPHATE SERPL-MCNC: 5.1 MG/DL — HIGH (ref 2.5–4.5)
PLATELET # BLD AUTO: 199 K/UL — SIGNIFICANT CHANGE UP (ref 150–400)
POTASSIUM SERPL-MCNC: 3.9 MMOL/L — SIGNIFICANT CHANGE UP (ref 3.5–5.3)
POTASSIUM SERPL-SCNC: 3.9 MMOL/L — SIGNIFICANT CHANGE UP (ref 3.5–5.3)
RBC # BLD: 3.54 M/UL — LOW (ref 3.8–5.2)
RBC # FLD: 12 % — SIGNIFICANT CHANGE UP (ref 10.3–14.5)
SODIUM SERPL-SCNC: 138 MMOL/L — SIGNIFICANT CHANGE UP (ref 135–145)
WBC # BLD: 12.59 K/UL — HIGH (ref 3.8–10.5)
WBC # FLD AUTO: 12.59 K/UL — HIGH (ref 3.8–10.5)

## 2023-05-04 RX ORDER — SODIUM CHLORIDE 9 MG/ML
1000 INJECTION, SOLUTION INTRAVENOUS
Refills: 0 | Status: DISCONTINUED | OUTPATIENT
Start: 2023-05-04 | End: 2023-05-05

## 2023-05-04 RX ORDER — ACETAMINOPHEN 500 MG
1000 TABLET ORAL EVERY 6 HOURS
Refills: 0 | Status: DISCONTINUED | OUTPATIENT
Start: 2023-05-04 | End: 2023-05-08

## 2023-05-04 RX ORDER — IBUPROFEN 200 MG
400 TABLET ORAL EVERY 6 HOURS
Refills: 0 | Status: DISCONTINUED | OUTPATIENT
Start: 2023-05-04 | End: 2023-05-08

## 2023-05-04 RX ORDER — MAGNESIUM OXIDE 400 MG ORAL TABLET 241.3 MG
800 TABLET ORAL
Refills: 0 | Status: DISCONTINUED | OUTPATIENT
Start: 2023-05-04 | End: 2023-05-07

## 2023-05-04 RX ADMIN — PANTOPRAZOLE SODIUM 40 MILLIGRAM(S): 20 TABLET, DELAYED RELEASE ORAL at 11:33

## 2023-05-04 RX ADMIN — Medication 1000 MILLIGRAM(S): at 12:30

## 2023-05-04 RX ADMIN — Medication 400 MILLIGRAM(S): at 17:38

## 2023-05-04 RX ADMIN — MAGNESIUM OXIDE 400 MG ORAL TABLET 800 MILLIGRAM(S): 241.3 TABLET ORAL at 17:38

## 2023-05-04 RX ADMIN — ENOXAPARIN SODIUM 40 MILLIGRAM(S): 100 INJECTION SUBCUTANEOUS at 00:15

## 2023-05-04 RX ADMIN — Medication 400 MILLIGRAM(S): at 23:51

## 2023-05-04 RX ADMIN — Medication 400 MILLIGRAM(S): at 11:33

## 2023-05-04 RX ADMIN — Medication 400 MILLIGRAM(S): at 05:28

## 2023-05-04 RX ADMIN — Medication 1000 MILLIGRAM(S): at 05:42

## 2023-05-04 RX ADMIN — SODIUM CHLORIDE 3 MILLILITER(S): 9 INJECTION INTRAMUSCULAR; INTRAVENOUS; SUBCUTANEOUS at 13:26

## 2023-05-04 RX ADMIN — SODIUM CHLORIDE 3 MILLILITER(S): 9 INJECTION INTRAMUSCULAR; INTRAVENOUS; SUBCUTANEOUS at 05:49

## 2023-05-04 RX ADMIN — Medication 1000 MILLIGRAM(S): at 00:29

## 2023-05-04 RX ADMIN — ENOXAPARIN SODIUM 40 MILLIGRAM(S): 100 INJECTION SUBCUTANEOUS at 23:51

## 2023-05-04 RX ADMIN — SODIUM CHLORIDE 3 MILLILITER(S): 9 INJECTION INTRAMUSCULAR; INTRAVENOUS; SUBCUTANEOUS at 21:19

## 2023-05-04 RX ADMIN — Medication 400 MILLIGRAM(S): at 00:14

## 2023-05-04 NOTE — PHYSICAL THERAPY INITIAL EVALUATION ADULT - GENERAL OBSERVATIONS, REHAB EVAL
Consult received, chart reviewed. Patient received supine in bed, NAD, + SPo2 monitoring, Erich seay, Patient agreed to EVALUATION from Physical Therapist. Daughter bedside

## 2023-05-04 NOTE — PHYSICAL THERAPY INITIAL EVALUATION ADULT - DIAGNOSIS, PT EVAL
(ICF Model) Pt. present w/deficits in Body Structures/Function (Impairments), incl: Strength, Balance, ROM leading to deficits in performing the below noted Activities (Limitations).

## 2023-05-04 NOTE — PHYSICAL THERAPY INITIAL EVALUATION ADULT - LIVES WITH, PROFILE
family in private home, 2 stairs with Lt rail ascendign to access, once inside, bedroom and bathroom on main floor

## 2023-05-05 ENCOUNTER — TRANSCRIPTION ENCOUNTER (OUTPATIENT)
Age: 83
End: 2023-05-05

## 2023-05-05 LAB
ANION GAP SERPL CALC-SCNC: 3 MMOL/L — LOW (ref 5–17)
BUN SERPL-MCNC: 16 MG/DL — SIGNIFICANT CHANGE UP (ref 7–18)
CALCIUM SERPL-MCNC: 8.6 MG/DL — SIGNIFICANT CHANGE UP (ref 8.4–10.5)
CHLORIDE SERPL-SCNC: 112 MMOL/L — HIGH (ref 96–108)
CO2 SERPL-SCNC: 26 MMOL/L — SIGNIFICANT CHANGE UP (ref 22–31)
CREAT SERPL-MCNC: 0.74 MG/DL — SIGNIFICANT CHANGE UP (ref 0.5–1.3)
EGFR: 81 ML/MIN/1.73M2 — SIGNIFICANT CHANGE UP
GLUCOSE SERPL-MCNC: 126 MG/DL — HIGH (ref 70–99)
HCT VFR BLD CALC: 32.7 % — LOW (ref 34.5–45)
HGB BLD-MCNC: 11 G/DL — LOW (ref 11.5–15.5)
MAGNESIUM SERPL-MCNC: 2 MG/DL — SIGNIFICANT CHANGE UP (ref 1.6–2.6)
MCHC RBC-ENTMCNC: 32.2 PG — SIGNIFICANT CHANGE UP (ref 27–34)
MCHC RBC-ENTMCNC: 33.6 GM/DL — SIGNIFICANT CHANGE UP (ref 32–36)
MCV RBC AUTO: 95.6 FL — SIGNIFICANT CHANGE UP (ref 80–100)
NRBC # BLD: 0 /100 WBCS — SIGNIFICANT CHANGE UP (ref 0–0)
PHOSPHATE SERPL-MCNC: 2.9 MG/DL — SIGNIFICANT CHANGE UP (ref 2.5–4.5)
PLATELET # BLD AUTO: 203 K/UL — SIGNIFICANT CHANGE UP (ref 150–400)
POTASSIUM SERPL-MCNC: 3.6 MMOL/L — SIGNIFICANT CHANGE UP (ref 3.5–5.3)
POTASSIUM SERPL-SCNC: 3.6 MMOL/L — SIGNIFICANT CHANGE UP (ref 3.5–5.3)
RBC # BLD: 3.42 M/UL — LOW (ref 3.8–5.2)
RBC # FLD: 12.1 % — SIGNIFICANT CHANGE UP (ref 10.3–14.5)
SODIUM SERPL-SCNC: 141 MMOL/L — SIGNIFICANT CHANGE UP (ref 135–145)
WBC # BLD: 10.12 K/UL — SIGNIFICANT CHANGE UP (ref 3.8–10.5)
WBC # FLD AUTO: 10.12 K/UL — SIGNIFICANT CHANGE UP (ref 3.8–10.5)

## 2023-05-05 PROCEDURE — 72192 CT PELVIS W/O DYE: CPT | Mod: 26

## 2023-05-05 RX ORDER — OXYCODONE HYDROCHLORIDE 5 MG/1
1 TABLET ORAL
Qty: 0 | Refills: 0 | DISCHARGE
Start: 2023-05-05

## 2023-05-05 RX ORDER — OXYCODONE HYDROCHLORIDE 5 MG/1
0 TABLET ORAL
Qty: 5 | Refills: 0
Start: 2023-05-05

## 2023-05-05 RX ORDER — IBUPROFEN 200 MG
1 TABLET ORAL
Qty: 0 | Refills: 0 | DISCHARGE
Start: 2023-05-05

## 2023-05-05 RX ORDER — METOPROLOL TARTRATE 50 MG
1 TABLET ORAL
Qty: 0 | Refills: 0 | DISCHARGE

## 2023-05-05 RX ORDER — ACETAMINOPHEN 500 MG
2 TABLET ORAL
Qty: 56 | Refills: 0
Start: 2023-05-05 | End: 2023-05-11

## 2023-05-05 RX ADMIN — Medication 400 MILLIGRAM(S): at 17:36

## 2023-05-05 RX ADMIN — Medication 1000 MILLIGRAM(S): at 18:16

## 2023-05-05 RX ADMIN — Medication 400 MILLIGRAM(S): at 00:29

## 2023-05-05 RX ADMIN — Medication 1000 MILLIGRAM(S): at 17:36

## 2023-05-05 RX ADMIN — Medication 400 MILLIGRAM(S): at 12:11

## 2023-05-05 RX ADMIN — PANTOPRAZOLE SODIUM 40 MILLIGRAM(S): 20 TABLET, DELAYED RELEASE ORAL at 12:11

## 2023-05-05 RX ADMIN — SODIUM CHLORIDE 3 MILLILITER(S): 9 INJECTION INTRAMUSCULAR; INTRAVENOUS; SUBCUTANEOUS at 13:06

## 2023-05-05 RX ADMIN — Medication 400 MILLIGRAM(S): at 13:06

## 2023-05-05 RX ADMIN — Medication 1000 MILLIGRAM(S): at 23:54

## 2023-05-05 RX ADMIN — Medication 400 MILLIGRAM(S): at 23:55

## 2023-05-05 RX ADMIN — Medication 1000 MILLIGRAM(S): at 06:20

## 2023-05-05 RX ADMIN — Medication 1000 MILLIGRAM(S): at 12:11

## 2023-05-05 RX ADMIN — SODIUM CHLORIDE 3 MILLILITER(S): 9 INJECTION INTRAMUSCULAR; INTRAVENOUS; SUBCUTANEOUS at 05:42

## 2023-05-05 RX ADMIN — Medication 400 MILLIGRAM(S): at 18:16

## 2023-05-05 RX ADMIN — MAGNESIUM OXIDE 400 MG ORAL TABLET 800 MILLIGRAM(S): 241.3 TABLET ORAL at 17:36

## 2023-05-05 RX ADMIN — ENOXAPARIN SODIUM 40 MILLIGRAM(S): 100 INJECTION SUBCUTANEOUS at 23:55

## 2023-05-05 RX ADMIN — MAGNESIUM OXIDE 400 MG ORAL TABLET 800 MILLIGRAM(S): 241.3 TABLET ORAL at 08:17

## 2023-05-05 RX ADMIN — Medication 1000 MILLIGRAM(S): at 13:06

## 2023-05-05 RX ADMIN — SODIUM CHLORIDE 3 MILLILITER(S): 9 INJECTION INTRAMUSCULAR; INTRAVENOUS; SUBCUTANEOUS at 21:06

## 2023-05-05 RX ADMIN — Medication 1000 MILLIGRAM(S): at 05:53

## 2023-05-05 NOTE — CHART NOTE - NSCHARTNOTEFT_GEN_A_CORE
Pt POD 0 s/p Robotic assisted laparoscopic low anterior resection.  resting comfortably  no n/v  seay clear UO    Vital Signs Last 24 Hrs  T(C): 36.4 (03 May 2023 21:46), Max: 36.6 (03 May 2023 20:28)  T(F): 97.5 (03 May 2023 21:46), Max: 97.9 (03 May 2023 20:28)  HR: 75 (03 May 2023 21:46) (75 - 98)  BP: 141/46 (03 May 2023 21:46) (120/44 - 141/46)  BP(mean): 69 (03 May 2023 21:46) (60 - 71)  RR: 18 (03 May 2023 21:46) (13 - 20)  SpO2: 99% (03 May 2023 21:46) (95% - 100%)    Parameters below as of 03 May 2023 21:46  Patient On (Oxygen Delivery Method): nasal cannula  O2 Flow (L/min): 2    abd soft, inc/dsg CDI    stable post-op
Called patient's daughter Ontiveros and informed her of CT scan results - positive for leak. Discussed anticipated hospital stay over this weekend as well as plans for repeat study. Patient's daughter was appreciative of information.

## 2023-05-05 NOTE — DISCHARGE NOTE PROVIDER - NSDCMRMEDTOKEN_GEN_ALL_CORE_FT
Acetaminophen Extra Strength Gelcaps 500 m tab(s) orally every 6 hours   mg oral tablet: 1 tab(s) orally every 6 hours  losartan 50 mg oral tablet: 1 orally  meclizine 25 mg oral tablet: 1 orally  metoprolol succinate 50 mg oral capsule, extended release: 1 capsule, extended release orally once a day  metroNIDAZOLE 500 mg oral tablet: 1 tab(s) orally every 8 hours  oxyCODONE 5 mg oral tablet: 1 tab(s) orally every 4 hours As needed Severe Pain (7 - 10)   Acetaminophen Extra Strength Gelcaps 500 m tab(s) orally every 6 hours   mg oral tablet: 1 tab(s) orally every 6 hours  losartan 50 mg oral tablet: 1 orally  meclizine 25 mg oral tablet: 1 orally  metoprolol succinate 50 mg oral capsule, extended release: 1 capsule, extended release orally once a day  metroNIDAZOLE 500 mg oral tablet: 1 tab(s) orally every 8 hours

## 2023-05-05 NOTE — DISCHARGE NOTE PROVIDER - NSDCCPTREATMENT_GEN_ALL_CORE_FT
PRINCIPAL PROCEDURE  Procedure: Colectomy, partial, with anastomosis  Findings and Treatment: 5/3 robotic assisted laparoscopic low anterior resection with end to end anastomosis

## 2023-05-05 NOTE — DISCHARGE NOTE PROVIDER - PROVIDER TOKENS
PROVIDER:[TOKEN:[[id number]],FOLLOWUP:[2 weeks],ESTABLISHEDPATIENT:[T]] PROVIDER:[TOKEN:[[id number]],FOLLOWUP:[2 weeks],ESTABLISHEDPATIENT:[T]],PROVIDER:[TOKEN:[[id number]],FOLLOWUP:[1 week]]

## 2023-05-05 NOTE — DISCHARGE NOTE PROVIDER - HOSPITAL COURSE
82F history of diverticular disease and colovesicular fistula s/p robotic assisted laparoscopic low anterior resection on 5/3 and postoperative admission. Advanced on ERAS protocol. Tolerated LRD without issue and pain was controlled. Ambulating and demonstrating bowel function. Underwent CT cystogram on 5/5 for evaluation of fistula given negative intraoperative methylene blue test. 82F history of diverticular disease and colovesicular fistula s/p robotic assisted laparoscopic low anterior resection on 5/3 and postoperative admission. Advanced on ERAS protocol. Tolerated LRD without issue and pain was controlled. Ambulating and demonstrating bowel function. Underwent CT cystogram on 5/5 for evaluation of fistula given negative intraoperative methylene blue test - CT cysto showed a small bladder leak into mesentery. Repeat CT cysto on 5/8/23 showed a persistent (although smaller) bladder leak from the same area. Patient's urologist Dr. Chavarria was contacted who recommended discharge with seay in place and f/u urology clinic in 1 week.

## 2023-05-05 NOTE — DISCHARGE NOTE PROVIDER - CARE PROVIDER_API CALL
Charlee Goncalves (MD)  ColonRectal Surgery; Surgery  [address] [facility], [address]  [place] [address]  Phone: [phone]  Fax: [phone]  Established Patient  Follow Up Time: 2 weeks   Charlee Goncalves (MD)  ColonRectal Surgery; Surgery  [address] [facility], [address]  [place] [address]  Phone: [phone]  Fax: [phone]  Established Patient  Follow Up Time: 2 weeks    Phil Chavarria)  Urology  [address] [facility], [address]  [place] [address]  Phone: [phone]  Fax: [phone]  Follow Up Time: 1 week

## 2023-05-05 NOTE — DISCHARGE NOTE PROVIDER - NSDCCPCAREPLAN_GEN_ALL_CORE_FT
PRINCIPAL DISCHARGE DIAGNOSIS  Diagnosis: Diverticulitis of intestine  Assessment and Plan of Treatment:

## 2023-05-06 LAB
ANION GAP SERPL CALC-SCNC: 6 MMOL/L — SIGNIFICANT CHANGE UP (ref 5–17)
BUN SERPL-MCNC: 15 MG/DL — SIGNIFICANT CHANGE UP (ref 7–18)
CALCIUM SERPL-MCNC: 8.5 MG/DL — SIGNIFICANT CHANGE UP (ref 8.4–10.5)
CHLORIDE SERPL-SCNC: 108 MMOL/L — SIGNIFICANT CHANGE UP (ref 96–108)
CO2 SERPL-SCNC: 26 MMOL/L — SIGNIFICANT CHANGE UP (ref 22–31)
CREAT SERPL-MCNC: 0.7 MG/DL — SIGNIFICANT CHANGE UP (ref 0.5–1.3)
EGFR: 87 ML/MIN/1.73M2 — SIGNIFICANT CHANGE UP
GLUCOSE SERPL-MCNC: 120 MG/DL — HIGH (ref 70–99)
HCT VFR BLD CALC: 32 % — LOW (ref 34.5–45)
HGB BLD-MCNC: 10.8 G/DL — LOW (ref 11.5–15.5)
MAGNESIUM SERPL-MCNC: 1.9 MG/DL — SIGNIFICANT CHANGE UP (ref 1.6–2.6)
MCHC RBC-ENTMCNC: 32.1 PG — SIGNIFICANT CHANGE UP (ref 27–34)
MCHC RBC-ENTMCNC: 33.8 GM/DL — SIGNIFICANT CHANGE UP (ref 32–36)
MCV RBC AUTO: 95.2 FL — SIGNIFICANT CHANGE UP (ref 80–100)
NRBC # BLD: 0 /100 WBCS — SIGNIFICANT CHANGE UP (ref 0–0)
PHOSPHATE SERPL-MCNC: 2.2 MG/DL — LOW (ref 2.5–4.5)
PLATELET # BLD AUTO: 201 K/UL — SIGNIFICANT CHANGE UP (ref 150–400)
POTASSIUM SERPL-MCNC: 3.4 MMOL/L — LOW (ref 3.5–5.3)
POTASSIUM SERPL-SCNC: 3.4 MMOL/L — LOW (ref 3.5–5.3)
RBC # BLD: 3.36 M/UL — LOW (ref 3.8–5.2)
RBC # FLD: 11.9 % — SIGNIFICANT CHANGE UP (ref 10.3–14.5)
SODIUM SERPL-SCNC: 140 MMOL/L — SIGNIFICANT CHANGE UP (ref 135–145)
WBC # BLD: 8.65 K/UL — SIGNIFICANT CHANGE UP (ref 3.8–10.5)
WBC # FLD AUTO: 8.65 K/UL — SIGNIFICANT CHANGE UP (ref 3.8–10.5)

## 2023-05-06 RX ORDER — SODIUM,POTASSIUM PHOSPHATES 278-250MG
2 POWDER IN PACKET (EA) ORAL
Refills: 0 | Status: COMPLETED | OUTPATIENT
Start: 2023-05-06 | End: 2023-05-07

## 2023-05-06 RX ORDER — POTASSIUM CHLORIDE 20 MEQ
20 PACKET (EA) ORAL
Refills: 0 | Status: COMPLETED | OUTPATIENT
Start: 2023-05-06 | End: 2023-05-06

## 2023-05-06 RX ADMIN — Medication 50 MILLIGRAM(S): at 05:33

## 2023-05-06 RX ADMIN — MAGNESIUM OXIDE 400 MG ORAL TABLET 800 MILLIGRAM(S): 241.3 TABLET ORAL at 18:55

## 2023-05-06 RX ADMIN — Medication 1000 MILLIGRAM(S): at 00:30

## 2023-05-06 RX ADMIN — Medication 20 MILLIEQUIVALENT(S): at 14:13

## 2023-05-06 RX ADMIN — PANTOPRAZOLE SODIUM 40 MILLIGRAM(S): 20 TABLET, DELAYED RELEASE ORAL at 11:50

## 2023-05-06 RX ADMIN — SODIUM CHLORIDE 3 MILLILITER(S): 9 INJECTION INTRAMUSCULAR; INTRAVENOUS; SUBCUTANEOUS at 14:02

## 2023-05-06 RX ADMIN — Medication 400 MILLIGRAM(S): at 05:33

## 2023-05-06 RX ADMIN — Medication 20 MILLIEQUIVALENT(S): at 11:50

## 2023-05-06 RX ADMIN — SODIUM CHLORIDE 3 MILLILITER(S): 9 INJECTION INTRAMUSCULAR; INTRAVENOUS; SUBCUTANEOUS at 05:13

## 2023-05-06 RX ADMIN — Medication 2 PACKET(S): at 12:23

## 2023-05-06 RX ADMIN — MAGNESIUM OXIDE 400 MG ORAL TABLET 800 MILLIGRAM(S): 241.3 TABLET ORAL at 08:46

## 2023-05-06 RX ADMIN — Medication 400 MILLIGRAM(S): at 00:30

## 2023-05-06 RX ADMIN — Medication 2 PACKET(S): at 21:44

## 2023-05-06 RX ADMIN — SODIUM CHLORIDE 3 MILLILITER(S): 9 INJECTION INTRAMUSCULAR; INTRAVENOUS; SUBCUTANEOUS at 21:08

## 2023-05-06 RX ADMIN — Medication 400 MILLIGRAM(S): at 05:56

## 2023-05-07 LAB
ANION GAP SERPL CALC-SCNC: 3 MMOL/L — LOW (ref 5–17)
BUN SERPL-MCNC: 14 MG/DL — SIGNIFICANT CHANGE UP (ref 7–18)
CALCIUM SERPL-MCNC: 9.1 MG/DL — SIGNIFICANT CHANGE UP (ref 8.4–10.5)
CHLORIDE SERPL-SCNC: 108 MMOL/L — SIGNIFICANT CHANGE UP (ref 96–108)
CO2 SERPL-SCNC: 27 MMOL/L — SIGNIFICANT CHANGE UP (ref 22–31)
CREAT SERPL-MCNC: 0.62 MG/DL — SIGNIFICANT CHANGE UP (ref 0.5–1.3)
EGFR: 89 ML/MIN/1.73M2 — SIGNIFICANT CHANGE UP
GLUCOSE SERPL-MCNC: 139 MG/DL — HIGH (ref 70–99)
HCT VFR BLD CALC: 32.8 % — LOW (ref 34.5–45)
HGB BLD-MCNC: 11.4 G/DL — LOW (ref 11.5–15.5)
MAGNESIUM SERPL-MCNC: 1.9 MG/DL — SIGNIFICANT CHANGE UP (ref 1.6–2.6)
MCHC RBC-ENTMCNC: 32.6 PG — SIGNIFICANT CHANGE UP (ref 27–34)
MCHC RBC-ENTMCNC: 34.8 GM/DL — SIGNIFICANT CHANGE UP (ref 32–36)
MCV RBC AUTO: 93.7 FL — SIGNIFICANT CHANGE UP (ref 80–100)
NRBC # BLD: 0 /100 WBCS — SIGNIFICANT CHANGE UP (ref 0–0)
PHOSPHATE SERPL-MCNC: 2.9 MG/DL — SIGNIFICANT CHANGE UP (ref 2.5–4.5)
PLATELET # BLD AUTO: 240 K/UL — SIGNIFICANT CHANGE UP (ref 150–400)
POTASSIUM SERPL-MCNC: 4 MMOL/L — SIGNIFICANT CHANGE UP (ref 3.5–5.3)
POTASSIUM SERPL-SCNC: 4 MMOL/L — SIGNIFICANT CHANGE UP (ref 3.5–5.3)
RBC # BLD: 3.5 M/UL — LOW (ref 3.8–5.2)
RBC # FLD: 11.7 % — SIGNIFICANT CHANGE UP (ref 10.3–14.5)
SODIUM SERPL-SCNC: 138 MMOL/L — SIGNIFICANT CHANGE UP (ref 135–145)
WBC # BLD: 8.89 K/UL — SIGNIFICANT CHANGE UP (ref 3.8–10.5)
WBC # FLD AUTO: 8.89 K/UL — SIGNIFICANT CHANGE UP (ref 3.8–10.5)

## 2023-05-07 RX ADMIN — SODIUM CHLORIDE 3 MILLILITER(S): 9 INJECTION INTRAMUSCULAR; INTRAVENOUS; SUBCUTANEOUS at 13:23

## 2023-05-07 RX ADMIN — Medication 1000 MILLIGRAM(S): at 00:03

## 2023-05-07 RX ADMIN — Medication 400 MILLIGRAM(S): at 18:58

## 2023-05-07 RX ADMIN — Medication 400 MILLIGRAM(S): at 00:03

## 2023-05-07 RX ADMIN — Medication 400 MILLIGRAM(S): at 00:33

## 2023-05-07 RX ADMIN — Medication 1000 MILLIGRAM(S): at 06:04

## 2023-05-07 RX ADMIN — Medication 400 MILLIGRAM(S): at 06:04

## 2023-05-07 RX ADMIN — PANTOPRAZOLE SODIUM 40 MILLIGRAM(S): 20 TABLET, DELAYED RELEASE ORAL at 12:23

## 2023-05-07 RX ADMIN — Medication 400 MILLIGRAM(S): at 17:50

## 2023-05-07 RX ADMIN — Medication 1000 MILLIGRAM(S): at 17:50

## 2023-05-07 RX ADMIN — Medication 1000 MILLIGRAM(S): at 07:00

## 2023-05-07 RX ADMIN — SODIUM CHLORIDE 3 MILLILITER(S): 9 INJECTION INTRAMUSCULAR; INTRAVENOUS; SUBCUTANEOUS at 06:03

## 2023-05-07 RX ADMIN — MAGNESIUM OXIDE 400 MG ORAL TABLET 800 MILLIGRAM(S): 241.3 TABLET ORAL at 08:33

## 2023-05-07 RX ADMIN — Medication 30 MILLILITER(S): at 21:24

## 2023-05-07 RX ADMIN — SODIUM CHLORIDE 3 MILLILITER(S): 9 INJECTION INTRAMUSCULAR; INTRAVENOUS; SUBCUTANEOUS at 21:19

## 2023-05-07 RX ADMIN — Medication 50 MILLIGRAM(S): at 06:04

## 2023-05-07 RX ADMIN — Medication 400 MILLIGRAM(S): at 07:00

## 2023-05-07 RX ADMIN — Medication 1000 MILLIGRAM(S): at 00:30

## 2023-05-07 RX ADMIN — Medication 1000 MILLIGRAM(S): at 18:58

## 2023-05-07 RX ADMIN — ENOXAPARIN SODIUM 40 MILLIGRAM(S): 100 INJECTION SUBCUTANEOUS at 00:06

## 2023-05-07 RX ADMIN — Medication 2 PACKET(S): at 08:34

## 2023-05-07 NOTE — PROGRESS NOTE ADULT - ASSESSMENT
82M POD3 robotic-assisted LAR, recovering as expected.    Plan  -c/w LRD, monitor bowel function  -c/w seay - CT cysto repeat 5/8/23 for evaluation of contrast leak from bladder  -encourage ambulation, out of bed as tolerated for all meals  -monitor bowel function  -vitals q4  -appreciate PT recs and assistance with mobilization  -am labs
POD4 robotic-assisted LAR healing well, clinically stable, postop CT cysto on 5/5 demonstrated bladder leak of contrast around seay, seay maintained in anticipation of repeat CT cysto 5/8    -maintain LRD, monitor bowel function  -tiered pain control  -OOB for all meals  -lovenox, SCDs while in bed, ambulate as tolerated  -am labs  -repeat CT cysto monday 5/8
82M POD1 robotic-assisted LAR, recovering as expected.    Plan  -c/w ERP day 0  -c/w clear liquid diet - advance in PM if tolerating  -c/w IVF until able to take full trays  -c/w seay - CT cysto tomorrow 5/5/23 for fistula  -c/w IV acetaminophen until 24 hours  -start Mag Ox  -encourage ambulation  -monitor bowel function
82M POD2 robotic-assisted LAR, recovering as expected.    Plan  -c/w ERP day 1  -c/w low residue diet  -c/w seay - CT cysto tomorrow 5/5/23 for fistula  -encourage ambulation  -monitor bowel function

## 2023-05-08 ENCOUNTER — TRANSCRIPTION ENCOUNTER (OUTPATIENT)
Age: 83
End: 2023-05-08

## 2023-05-08 VITALS
RESPIRATION RATE: 18 BRPM | TEMPERATURE: 98 F | SYSTOLIC BLOOD PRESSURE: 137 MMHG | HEART RATE: 86 BPM | OXYGEN SATURATION: 99 % | DIASTOLIC BLOOD PRESSURE: 46 MMHG

## 2023-05-08 LAB
ANION GAP SERPL CALC-SCNC: 7 MMOL/L — SIGNIFICANT CHANGE UP (ref 5–17)
BUN SERPL-MCNC: 20 MG/DL — HIGH (ref 7–18)
CALCIUM SERPL-MCNC: 8.8 MG/DL — SIGNIFICANT CHANGE UP (ref 8.4–10.5)
CHLORIDE SERPL-SCNC: 107 MMOL/L — SIGNIFICANT CHANGE UP (ref 96–108)
CO2 SERPL-SCNC: 25 MMOL/L — SIGNIFICANT CHANGE UP (ref 22–31)
CREAT SERPL-MCNC: 0.79 MG/DL — SIGNIFICANT CHANGE UP (ref 0.5–1.3)
EGFR: 75 ML/MIN/1.73M2 — SIGNIFICANT CHANGE UP
GLUCOSE SERPL-MCNC: 138 MG/DL — HIGH (ref 70–99)
HCT VFR BLD CALC: 33.6 % — LOW (ref 34.5–45)
HGB BLD-MCNC: 11.6 G/DL — SIGNIFICANT CHANGE UP (ref 11.5–15.5)
MAGNESIUM SERPL-MCNC: 2 MG/DL — SIGNIFICANT CHANGE UP (ref 1.6–2.6)
MCHC RBC-ENTMCNC: 32.3 PG — SIGNIFICANT CHANGE UP (ref 27–34)
MCHC RBC-ENTMCNC: 34.5 GM/DL — SIGNIFICANT CHANGE UP (ref 32–36)
MCV RBC AUTO: 93.6 FL — SIGNIFICANT CHANGE UP (ref 80–100)
NRBC # BLD: 0 /100 WBCS — SIGNIFICANT CHANGE UP (ref 0–0)
PHOSPHATE SERPL-MCNC: 4.2 MG/DL — SIGNIFICANT CHANGE UP (ref 2.5–4.5)
PLATELET # BLD AUTO: 260 K/UL — SIGNIFICANT CHANGE UP (ref 150–400)
POTASSIUM SERPL-MCNC: 4 MMOL/L — SIGNIFICANT CHANGE UP (ref 3.5–5.3)
POTASSIUM SERPL-SCNC: 4 MMOL/L — SIGNIFICANT CHANGE UP (ref 3.5–5.3)
RBC # BLD: 3.59 M/UL — LOW (ref 3.8–5.2)
RBC # FLD: 11.6 % — SIGNIFICANT CHANGE UP (ref 10.3–14.5)
SODIUM SERPL-SCNC: 139 MMOL/L — SIGNIFICANT CHANGE UP (ref 135–145)
WBC # BLD: 7.68 K/UL — SIGNIFICANT CHANGE UP (ref 3.8–10.5)
WBC # FLD AUTO: 7.68 K/UL — SIGNIFICANT CHANGE UP (ref 3.8–10.5)

## 2023-05-08 PROCEDURE — S2900: CPT

## 2023-05-08 PROCEDURE — 82962 GLUCOSE BLOOD TEST: CPT

## 2023-05-08 PROCEDURE — 97162 PT EVAL MOD COMPLEX 30 MIN: CPT

## 2023-05-08 PROCEDURE — 86901 BLOOD TYPING SEROLOGIC RH(D): CPT

## 2023-05-08 PROCEDURE — 72192 CT PELVIS W/O DYE: CPT

## 2023-05-08 PROCEDURE — 84100 ASSAY OF PHOSPHORUS: CPT

## 2023-05-08 PROCEDURE — 36415 COLL VENOUS BLD VENIPUNCTURE: CPT

## 2023-05-08 PROCEDURE — 88307 TISSUE EXAM BY PATHOLOGIST: CPT

## 2023-05-08 PROCEDURE — C1889: CPT

## 2023-05-08 PROCEDURE — 86850 RBC ANTIBODY SCREEN: CPT

## 2023-05-08 PROCEDURE — 83735 ASSAY OF MAGNESIUM: CPT

## 2023-05-08 PROCEDURE — 88304 TISSUE EXAM BY PATHOLOGIST: CPT

## 2023-05-08 PROCEDURE — 72193 CT PELVIS W/DYE: CPT

## 2023-05-08 PROCEDURE — 85027 COMPLETE CBC AUTOMATED: CPT

## 2023-05-08 PROCEDURE — 86900 BLOOD TYPING SEROLOGIC ABO: CPT

## 2023-05-08 PROCEDURE — 86923 COMPATIBILITY TEST ELECTRIC: CPT

## 2023-05-08 PROCEDURE — 72192 CT PELVIS W/O DYE: CPT | Mod: 26

## 2023-05-08 PROCEDURE — 80048 BASIC METABOLIC PNL TOTAL CA: CPT

## 2023-05-08 RX ORDER — OXYCODONE HYDROCHLORIDE 5 MG/1
1 TABLET ORAL
Qty: 5 | Refills: 0
Start: 2023-05-08

## 2023-05-08 RX ADMIN — Medication 1000 MILLIGRAM(S): at 17:30

## 2023-05-08 RX ADMIN — Medication 400 MILLIGRAM(S): at 18:27

## 2023-05-08 RX ADMIN — Medication 1000 MILLIGRAM(S): at 06:28

## 2023-05-08 RX ADMIN — PANTOPRAZOLE SODIUM 40 MILLIGRAM(S): 20 TABLET, DELAYED RELEASE ORAL at 12:42

## 2023-05-08 RX ADMIN — SODIUM CHLORIDE 3 MILLILITER(S): 9 INJECTION INTRAMUSCULAR; INTRAVENOUS; SUBCUTANEOUS at 13:44

## 2023-05-08 RX ADMIN — Medication 400 MILLIGRAM(S): at 17:28

## 2023-05-08 RX ADMIN — Medication 1000 MILLIGRAM(S): at 18:26

## 2023-05-08 RX ADMIN — SODIUM CHLORIDE 3 MILLILITER(S): 9 INJECTION INTRAMUSCULAR; INTRAVENOUS; SUBCUTANEOUS at 06:29

## 2023-05-08 RX ADMIN — Medication 400 MILLIGRAM(S): at 05:44

## 2023-05-08 RX ADMIN — Medication 1000 MILLIGRAM(S): at 05:46

## 2023-05-08 RX ADMIN — Medication 50 MILLIGRAM(S): at 05:44

## 2023-05-08 RX ADMIN — Medication 400 MILLIGRAM(S): at 06:29

## 2023-05-08 NOTE — DISCHARGE NOTE NURSING/CASE MANAGEMENT/SOCIAL WORK - NSDCPEFALRISK_GEN_ALL_CORE
For information on Fall & Injury Prevention, visit: [email or web address] OR  [email or web address] OR  https://www.cdc.gov/steadi/patient.html

## 2023-05-08 NOTE — DISCHARGE NOTE NURSING/CASE MANAGEMENT/SOCIAL WORK - PATIENT PORTAL LINK FT
You can access the FollowMyHealth Patient Portal offered by [facility] by registering at the following website: [email or web address]. By joining [facility]’s FollowMyHealth portal, you will also be able to view your health information using other applications (apps) compatible with our system.

## 2023-05-08 NOTE — PROGRESS NOTE ADULT - ATTENDING COMMENTS
Contiues to do well  Tolerating LRD and having flatus and BMs  Denies any issues with pain  HDS w/o fevers  Good UOP    will repeat CT cystogram and if persistent leak then will d/c w/ seay and plan for urology follow-up (Dr. Chavarria her urologist)  continue LRD  encourage ambulation    Charlee Goncalves MD  Attending Physician

## 2023-05-08 NOTE — PROGRESS NOTE ADULT - SUBJECTIVE AND OBJECTIVE BOX
POD2 robotic-assisted LAR. Pt resting comfortably, denies pain this morning. Tolerating her low residue diet without nausea, vomiting, or increase in abdominal pain. Denies bm's or flatus. No additional complaint. Passed trial of void.    Vital Signs Last 24 Hrs  T(C): 36.3 (05 May 2023 05:45), Max: 37 (04 May 2023 12:48)  T(F): 97.3 (05 May 2023 05:45), Max: 98.6 (04 May 2023 12:48)  HR: 87 (05 May 2023 05:45) (77 - 87)  BP: 143/49 (05 May 2023 05:45) (82/41 - 143/49)  BP(mean): --  RR: 16 (05 May 2023 05:45) (16 - 16)  SpO2: 98% (05 May 2023 07:54) (98% - 100%)  Parameters below as of 05 May 2023 07:54  Patient On (Oxygen Delivery Method): room air    General: no acute distress, comfortable  Resp: normal resp effort, non-labored  Abd: soft, minimally tender about port sites, periumbilical site dressing changed, no discharge noted from penrose, rest of port sites clean and dry  FROM all 4 extremities                                     11.0   10.12 )-----------( 203      ( 05 May 2023 05:36 )             32.7     05-05    141  |  112<H>  |  16  ----------------------------<  126<H>  3.6   |  26  |  0.74    Ca    8.6      05 May 2023 05:36  Phos  2.9     05-05  Mg     2.0     05-05    
POD3 robotic-assisted LAR. Pt resting comfortably, denies pain this morning with daughter at bedside. Tolerating her low residue diet without nausea, vomiting, or increase in abdominal pain. Passing flatus no BM. Ct cysto 5/5 demonstrated leak of contrast into small bowel mesentery, seay maintained with adequate clear yellow urine output.     Vital Signs Last 24 Hrs  T(C): 36.9 (06 May 2023 05:10), Max: 36.9 (06 May 2023 05:10)  T(F): 98.4 (06 May 2023 05:10), Max: 98.4 (06 May 2023 05:10)  HR: 95 (06 May 2023 05:10) (95 - 107)  BP: 156/63 (06 May 2023 05:10) (146/52 - 156/63)  BP(mean): --  RR: 18 (06 May 2023 05:10) (18 - 18)  SpO2: 97% (06 May 2023 05:10) (95% - 99%)    Parameters below as of 06 May 2023 05:10  Patient On (Oxygen Delivery Method): room air    PE:  General: no acute distress, comfortable in bed, head up  CVS: RRR  Resp: room air, appropriate respiratory effort without accessory muscles  Abd: soft, minimally tender about port sites, periumbilical site dressing changed, no discharge noted from penrose, rest of port sites clean and dry  Gu: seay in place with yellow urine  ext: FROMx4                          10.8   8.65  )-----------( 201      ( 06 May 2023 06:00 )             32.0   05-06    140  |  108  |  15  ----------------------------<  120<H>  3.4<L>   |  26  |  0.70    Ca    8.5      06 May 2023 06:00  Phos  2.2     05-06  Mg     1.9     05-06      
POD4 5/3 robotic-assisted LAR. Pt resting comfortably, denies pain this morning. Tolerating her low residue diet without nausea, vomiting, or increase in abdominal pain. Passing flatus and BM. Ct cysto 5/5 demonstrated leak of contrast into small bowel mesentery, seay maintained with adequate clear yellow urine output. C/w lovenox, tiered pain control.    Vital Signs Last 24 Hrs  T(C): 36.8 (07 May 2023 05:05), Max: 37.3 (06 May 2023 14:11)  T(F): 98.3 (07 May 2023 05:05), Max: 99.2 (06 May 2023 14:11)  HR: 78 (07 May 2023 06:06) (78 - 102)  BP: 149/63 (07 May 2023 06:06) (149/63 - 155/58)  BP(mean): --  RR: 17 (07 May 2023 05:05) (17 - 18)  SpO2: 97% (07 May 2023 05:05) (97% - 98%)    Parameters below as of 07 May 2023 05:05  Patient On (Oxygen Delivery Method): room air    PE:  Gen: walking from bathroom to sit upright in chair, no apparent distress  CVS: RRR  Pulm: room air, appropriate respiratory effort without accessory muscles  Abd: soft, nondistended, nontender to palpation, dressing clean dry intact, port sites well approximated with overlying steri strips no surrounding erythema, fluctuance or drainage. penrose drain in place under midline dressing  Ext: FROMx4                          11.4   8.89  )-----------( 240      ( 07 May 2023 06:38 )             32.8   05-07    138  |  108  |  14  ----------------------------<  139<H>  4.0   |  27  |  0.62    Ca    9.1      07 May 2023 06:38  Phos  2.9     05-07  Mg     1.9     05-07      
POD1 robotic-assisted LAR. Pt resting comfortably, complaining of mild abdominal pain well controlled with medications. Only took small amounts of PO overnight but no nausea/vomiting or increase in abd pain. Denies bm's or flatus. No additional complaint.    Vital Signs Last 24 Hrs  T(C): 36.6 (04 May 2023 05:22), Max: 36.6 (03 May 2023 20:28)  T(F): 97.8 (04 May 2023 05:22), Max: 97.9 (03 May 2023 20:28)  HR: 81 (04 May 2023 06:34) (75 - 98)  BP: 100/53 (04 May 2023 06:34) (100/53 - 141/46)  BP(mean): 69 (04 May 2023 06:34) (54 - 71)  RR: 17 (04 May 2023 05:22) (13 - 20)  SpO2: 100% (04 May 2023 05:22) (95% - 100%)  Parameters below as of 04 May 2023 05:22  Patient On (Oxygen Delivery Method): nasal cannula  O2 Flow (L/min): 3    General: no acute distress, comfortable  Resp: normal resp effort, non-labored  Abd: soft, minimally tender about port sites, periumbilical site dressing changed, no discharge noted from penrose, rest of port sites clean and dry  Colmenares in place with yellow urine  FROM all 4 extremities                          11.3   12.59 )-----------( 199      ( 04 May 2023 05:15 )             34.5     05-04    138  |  109<H>  |  22<H>  ----------------------------<  175<H>  3.9   |  19<L>  |  1.22    Ca    8.7      04 May 2023 05:15  Phos  5.1     05-04  Mg     1.8     05-04    I&O's Detail    03 May 2023 07:01  -  04 May 2023 07:00  --------------------------------------------------------  IN:    Lactated Ringers: 400 mL  Total IN: 400 mL    OUT:    Indwelling Catheter - Urethral (mL): 550 mL  Total OUT: 550 mL    Total NET: -150 mL    
S: Patient seen and examined at bedside. No acute overnight events. Hemodynamically stable, afebrile. POD5 s/p LAR now w/small bladder leak on prior CT cysto. Patient states she feels well and denies any complaints. Tolerating low residual diet. Has been out of bed and ambulatory. Seay continues to drain clear yellow urine 50cc/hr. Plan for rpt CT cysto today to reeval bladder leak.    O:  Vital Signs Last 24 Hrs  T(C): 36.4 (08 May 2023 05:42), Max: 37 (07 May 2023 14:59)  T(F): 97.6 (08 May 2023 05:42), Max: 98.6 (07 May 2023 14:59)  HR: 76 (08 May 2023 05:42) (76 - 89)  BP: 160/67 (08 May 2023 05:42) (136/49 - 160/67)  BP(mean): --  RR: 17 (08 May 2023 05:42) (16 - 17)  SpO2: 96% (08 May 2023 05:42) (96% - 97%)    Parameters below as of 08 May 2023 05:42  Patient On (Oxygen Delivery Method): room air    Physical Examination:  Gen: Awake, alert, oriented x3 and in no acute distress  Pulm: Normal work of breathing on room air, no respiratory distress  Abd: Soft, non-distended, non-tender. Incisions are CDI w/no bleeding, no surrounding erythema, no purulence. Midline incision w/penrose in place.   : Seay catheter in place draining clear yellow urine  Ext: Moving all extremities equally                          11.6   7.68  )-----------( 260      ( 08 May 2023 07:31 )             33.6   A: 82F POD5 s/p LAR now w/small bladder leak    P:  - C/w  floor  - Vitals Q4h  - Pain control  - C/w LRD  - C/w seay  - Rpt CT cysto today - f/u for reeval of bladder leak  - DVT ppx w/LVX

## 2023-05-09 ENCOUNTER — NON-APPOINTMENT (OUTPATIENT)
Age: 83
End: 2023-05-09

## 2023-05-09 PROBLEM — N32.1 VESICOINTESTINAL FISTULA: Chronic | Status: ACTIVE | Noted: 2023-04-28

## 2023-05-09 PROBLEM — I10 ESSENTIAL (PRIMARY) HYPERTENSION: Chronic | Status: ACTIVE | Noted: 2023-04-28

## 2023-05-09 PROBLEM — K57.92 DIVERTICULITIS OF INTESTINE, PART UNSPECIFIED, WITHOUT PERFORATION OR ABSCESS WITHOUT BLEEDING: Chronic | Status: ACTIVE | Noted: 2023-04-28

## 2023-05-09 PROBLEM — R42 DIZZINESS AND GIDDINESS: Chronic | Status: ACTIVE | Noted: 2023-04-28

## 2023-05-10 ENCOUNTER — NON-APPOINTMENT (OUTPATIENT)
Age: 83
End: 2023-05-10

## 2023-05-11 ENCOUNTER — NON-APPOINTMENT (OUTPATIENT)
Age: 83
End: 2023-05-11

## 2023-05-11 LAB — SURGICAL PATHOLOGY STUDY: SIGNIFICANT CHANGE UP

## 2023-05-16 ENCOUNTER — APPOINTMENT (OUTPATIENT)
Dept: SURGERY | Facility: CLINIC | Age: 83
End: 2023-05-16
Payer: MEDICARE

## 2023-05-16 ENCOUNTER — APPOINTMENT (OUTPATIENT)
Dept: UROLOGY | Facility: CLINIC | Age: 83
End: 2023-05-16
Payer: MEDICARE

## 2023-05-16 VITALS
HEIGHT: 57 IN | WEIGHT: 134 LBS | DIASTOLIC BLOOD PRESSURE: 81 MMHG | SYSTOLIC BLOOD PRESSURE: 140 MMHG | BODY MASS INDEX: 28.91 KG/M2 | HEART RATE: 96 BPM

## 2023-05-16 VITALS
BODY MASS INDEX: 27.01 KG/M2 | HEART RATE: 82 BPM | SYSTOLIC BLOOD PRESSURE: 136 MMHG | HEIGHT: 59 IN | OXYGEN SATURATION: 96 % | WEIGHT: 134 LBS | DIASTOLIC BLOOD PRESSURE: 63 MMHG | TEMPERATURE: 97.3 F

## 2023-05-16 DIAGNOSIS — R31.29 OTHER MICROSCOPIC HEMATURIA: ICD-10-CM

## 2023-05-16 PROCEDURE — 99024 POSTOP FOLLOW-UP VISIT: CPT

## 2023-05-16 PROCEDURE — 99214 OFFICE O/P EST MOD 30 MIN: CPT

## 2023-05-16 NOTE — PHYSICAL EXAM

## 2023-05-16 NOTE — HISTORY OF PRESENT ILLNESS
[FreeTextEntry1] : Very pleasant 82-year-old woman who presents for follow-up of colovesicular fistula status post recent fistula repair.  She underwent a CT scan postoperatively which demonstrated a defect in the bladder and extravasation of contrast from the defect.  She underwent a repeat cystogram a few days later which demonstrated a persistent leak.  She presents today to discuss these results, as well as further management options.

## 2023-05-17 ENCOUNTER — RESULT REVIEW (OUTPATIENT)
Age: 83
End: 2023-05-17

## 2023-05-23 NOTE — ASSESSMENT
[FreeTextEntry1] : Ms. ANNE SILVA is a 82 year F referred by Dr. Chavarria for complicated diverticulitis with colovesical fistula s/p robotic assisted laparoscopic low anterior resection, takedown of colovesical fistula and flex sig on 5/3/2023 here for a postop visit.

## 2023-05-23 NOTE — PHYSICAL EXAM
[Exam Deferred] : exam was deferred [Normal Rate and Rhythm] : normal rate and rhythm [No Rash or Lesion] : No rash or lesion [Alert] : alert [Oriented to Person] : oriented to person [Oriented to Place] : oriented to place [Oriented to Time] : oriented to time [JVD] : no jugular venous distention  [de-identified] : soft, non-distended, non-tender to palpation.  Incisions c/d/i w/o erythema or fluctuance and well healing, steri strips in place [de-identified] : awake, alert, NAD  [de-identified] : normocephalic, atraumatic, EOMI, normal conjunctiva  [de-identified] : b/l chest rise, EWOB on RA  [de-identified] : seay leg bag in place  [de-identified] : requires some assistance [de-identified] : b/l knee healed incisions [de-identified] : normal mood and affect

## 2023-05-23 NOTE — HISTORY OF PRESENT ILLNESS
[FreeTextEntry1] : Ms. ANNE SILVA is a [place] speaking 82 year F (daughter translated) with a history of recurrent UTI and microhematuria referred by Dr. Chavarria for colovesical fistula likely due to complicated diverticulitis s/p robotic assisted laparoscopic low anterior resection on 5/3/2023 here for a postop visit. Patient initially had urinary frequency and nocturia for which her PCP sent urine studies confirming a UTI and prescribed antibiotics (1st time 3days). When she had recurrent sxs off the abx her PCP prescribed it for longer but she still had recurrent sxs off antibiotics at least 2 more times despite treatment with various abx. Thus her PCP referred her to Dr. Chavarria who sent her for imaging. A CT A/P was done at [facility] and which revealed sigmoid diverticulitis inseparable from the urinary bladder with gas bubbles within the urinary bladder suggesting a colovesical fistula. There is also a sessile polyp mass at the base of the urinary bladder and recommend a cystoscopy. He performed a cystoscopy which looked consistent with a fistula as well. She was advised to go to the ED for her diverticulitis though she denies any abdominal pain. As she was not having pain, tolerating a diet, having bowel function without any signs of sepsis she was discharged home with PO Cipro/flagyl (completed the course yesterday) from the ED. She has bloating and sometimes sees fecal material in her urine. She has daily BMs. Denies dysuria, abdominal pain or fevers/chills. Denies taking fiber supplement, stool softeners and laxatives. Last colonoscopy done 11/2022 revealed diverticulosis. On 5/3/2023, she underwent robotic assisted laparoscopic low anterior resection, takedown of colovesical fistula, flex sig. By POD 2 she was having bowel function and tolerating a diet. On 5/5/2023, she underwent CT cystogram for post-op evaluation and was noted to still have a small bladder leak. A repeat CT cysto on 5/8/23 showed a persistent (although smaller) bladder leak from the same area. Thus that day, POD 5, patient was discharge with seay in place and plan for follow-up w/ urology for eventual removal of the seay. Today patient is doing well. She has some expected RLQ pain. She has been on a liquid diet as she's afraid to eat solid foods; has been having watery BMs. Urinary leg bag in place. She is scheduled to see Dr. Chavarria today. Denies dysuria, abdominal pain or fevers/chills.

## 2023-05-24 ENCOUNTER — APPOINTMENT (OUTPATIENT)
Dept: CT IMAGING | Facility: HOSPITAL | Age: 83
End: 2023-05-24
Payer: MEDICARE

## 2023-05-24 ENCOUNTER — OUTPATIENT (OUTPATIENT)
Dept: OUTPATIENT SERVICES | Facility: HOSPITAL | Age: 83
LOS: 1 days | End: 2023-05-24
Payer: MEDICARE

## 2023-05-24 DIAGNOSIS — Z96.652 PRESENCE OF LEFT ARTIFICIAL KNEE JOINT: Chronic | ICD-10-CM

## 2023-05-24 DIAGNOSIS — N32.1 VESICOINTESTINAL FISTULA: ICD-10-CM

## 2023-05-24 DIAGNOSIS — Z96.651 PRESENCE OF RIGHT ARTIFICIAL KNEE JOINT: Chronic | ICD-10-CM

## 2023-05-24 DIAGNOSIS — R32 UNSPECIFIED URINARY INCONTINENCE: ICD-10-CM

## 2023-05-24 PROCEDURE — 72193 CT PELVIS W/DYE: CPT

## 2023-05-24 PROCEDURE — 72193 CT PELVIS W/DYE: CPT | Mod: 26,MH

## 2023-06-02 ENCOUNTER — APPOINTMENT (OUTPATIENT)
Dept: UROLOGY | Facility: CLINIC | Age: 83
End: 2023-06-02
Payer: MEDICARE

## 2023-06-02 PROCEDURE — 99213 OFFICE O/P EST LOW 20 MIN: CPT | Mod: 25

## 2023-06-02 PROCEDURE — 51700 IRRIGATION OF BLADDER: CPT

## 2023-06-02 PROCEDURE — A4216: CPT | Mod: NC

## 2023-06-02 NOTE — PHYSICAL EXAM

## 2023-06-02 NOTE — ASSESSMENT
[FreeTextEntry1] : Very pleasant 82-year-old woman who presents for follow-up of colovesicular fistula status post repair\par -CT images reviewed demonstrating no evidence for persistent bladder leak\par -Trial of void performed today.  Patient was able to urinate without difficulty and to completion\par -Follow-up in approximately 1 week for PVR

## 2023-06-02 NOTE — HISTORY OF PRESENT ILLNESS
[FreeTextEntry1] : Very pleasant 82-year-old woman who presents for follow-up of colovesicular fistula status postrepair.  Postoperatively she was noted to have a small leak.  She recently underwent a CT cystogram which demonstrates no evidence of persistent bladder leak.  She presents today for Colmenares catheter removal.

## 2023-06-06 ENCOUNTER — APPOINTMENT (OUTPATIENT)
Dept: UROLOGY | Facility: CLINIC | Age: 83
End: 2023-06-06
Payer: MEDICARE

## 2023-06-06 VITALS
RESPIRATION RATE: 16 BRPM | BODY MASS INDEX: 27.01 KG/M2 | WEIGHT: 134 LBS | TEMPERATURE: 97.7 F | HEART RATE: 79 BPM | DIASTOLIC BLOOD PRESSURE: 63 MMHG | HEIGHT: 59 IN | OXYGEN SATURATION: 96 % | SYSTOLIC BLOOD PRESSURE: 159 MMHG

## 2023-06-06 DIAGNOSIS — B37.31 ACUTE CANDIDIASIS OF VULVA AND VAGINA: ICD-10-CM

## 2023-06-06 DIAGNOSIS — R32 UNSPECIFIED URINARY INCONTINENCE: ICD-10-CM

## 2023-06-06 PROCEDURE — 99214 OFFICE O/P EST MOD 30 MIN: CPT

## 2023-06-06 RX ORDER — MICONAZOLE NITRATE 200 MG-2 %
200 & 2 KIT VAGINAL TWICE DAILY
Qty: 1 | Refills: 0 | Status: ACTIVE | COMMUNITY
Start: 2023-06-06 | End: 1900-01-01

## 2023-06-06 NOTE — PHYSICAL EXAM

## 2023-06-06 NOTE — ASSESSMENT
[FreeTextEntry1] : Very pleasant 82-year-old woman who presents for follow-up of colovesicular fistula status post takedown, bladder leak status post closure, vaginal yeast infection\par -PVR today 24 cc\par -Start Monistat for vaginal yeast infection–prescription sent to the pharmacy\par -CT images again reviewed demonstrating no evidence of extravasation of contrast from the bladder\par -Follow-up in 1 month

## 2023-06-06 NOTE — HISTORY OF PRESENT ILLNESS
[FreeTextEntry1] : Chaperone: GISSLEL Castillo\par \par Very pleasant 82-year-old woman who presents for follow-up of colovesicular fistula status post takedown of colovesicular fistula approximately 1 month ago.  Colmenares catheter was removed last week after a cystogram demonstrated no evidence of extravasation of contrast from the bladder.  She reports no problems over the last week.  She reports that she has urinating normally.  She does report vaginal irritation.\par \par PVR today 24 cc

## 2023-06-13 ENCOUNTER — APPOINTMENT (OUTPATIENT)
Dept: SURGERY | Facility: CLINIC | Age: 83
End: 2023-06-13
Payer: MEDICARE

## 2023-06-13 VITALS
BODY MASS INDEX: 27.01 KG/M2 | DIASTOLIC BLOOD PRESSURE: 72 MMHG | SYSTOLIC BLOOD PRESSURE: 171 MMHG | HEIGHT: 59 IN | WEIGHT: 134 LBS | HEART RATE: 77 BPM

## 2023-06-13 PROCEDURE — 99024 POSTOP FOLLOW-UP VISIT: CPT

## 2023-06-14 NOTE — PHYSICAL EXAM
[Exam Deferred] : exam was deferred [Normal Rate and Rhythm] : normal rate and rhythm [No Rash or Lesion] : No rash or lesion [Alert] : alert [Oriented to Person] : oriented to person [Oriented to Place] : oriented to place [Oriented to Time] : oriented to time [JVD] : no jugular venous distention  [de-identified] : soft, non-distended, non-tender to palpation. Incisions c/d/i w/o erythema or fluctuance and well healing, RLQ w/ residual stitch x2 (removed)  [de-identified] : awake, alert, NAD  [de-identified] : normocephalic, atraumatic, EOMI, normal conjunctiva  [de-identified] : b/l chest rise, EWOB on RA  [de-identified] : deferred [de-identified] : requires some assistance [de-identified] : abd incisions as above [de-identified] : normal mood and affect

## 2023-06-14 NOTE — HISTORY OF PRESENT ILLNESS
[FreeTextEntry1] : Ms. ANNE SILVA is a [place]olese speaking 82 year F (daughter translated) with a history of recurrent UTI and microhematuria referred by Dr. Chavarria for colovesical fistula likely due to complicated diverticulitis s/p robotic assisted laparoscopic low anterior resection on 5/3/2023 here for a follow up visit. Patient initially had urinary frequency and nocturia for which her PCP sent urine studies confirming a UTI and prescribed antibiotics (1st time 3 days). When she had recurrent sxs off the abx her PCP prescribed it for longer but she still had recurrent sxs off antibiotics at least 2 more times despite treatment with various abx. Thus her PCP referred her to Dr. Chavarria who sent her for imaging. A CT A/P was done at [facility] and which revealed sigmoid diverticulitis inseparable from the urinary bladder with gas bubbles within the urinary bladder suggesting a colovesical fistula. There is also a sessile polyp mass at the base of the urinary bladder and recommend a cystoscopy. He performed a cystoscopy which looked consistent with a fistula as well. She was advised to go to the ED for her diverticulitis though she denies any abdominal pain. As she was not having pain, tolerating a diet, having bowel function without any signs of sepsis she was discharged home with PO Cipro/flagyl (completed the course yesterday) from the ED. She has bloating and sometimes sees fecal material in her urine. She has daily BMs. Denies dysuria, abdominal pain or fevers/chills. Denies taking fiber supplement, stool softeners and laxatives. Last colonoscopy done 11/2022 revealed diverticulosis. On 5/3/2023, she underwent robotic assisted laparoscopic low anterior resection, takedown of colovesical fistula, flex sig. By POD 2 she was having bowel function and tolerating a diet. On 5/5/2023, she underwent CT cystogram for post-op evaluation and was noted to still have a small bladder leak. A repeat CT cysto on 5/8/23 showed a persistent (although smaller) bladder leak from the same area. Thus that day, POD 5, patient was discharge with seay in place and plan for follow-up w/ urology for eventual removal of the seay. Repeat CT cystogram on 5/24/2023 demonstrated no evidence of persistent bladder leak. Seay removed 6/2/2023 [address] Dr[address] Chavarria without any issues. Today patient is doing well. Tolerating PO diet, having bowel function and urinating without issues. Denies dysuria, abdominal pain, fevers/chills.

## 2023-06-14 NOTE — ASSESSMENT
[FreeTextEntry1] : Ms. ANNE SILVA is a 82 year F referred by Dr. Chavarria for complicated diverticulitis with colovesical fistula s/p robotic assisted laparoscopic low anterior resection, takedown of colovesical fistula and flex sig on 5/3/2023.

## 2023-07-11 ENCOUNTER — APPOINTMENT (OUTPATIENT)
Dept: UROLOGY | Facility: CLINIC | Age: 83
End: 2023-07-11
Payer: MEDICARE

## 2023-07-11 VITALS
HEIGHT: 59 IN | OXYGEN SATURATION: 97 % | TEMPERATURE: 97.4 F | DIASTOLIC BLOOD PRESSURE: 64 MMHG | WEIGHT: 134 LBS | BODY MASS INDEX: 27.01 KG/M2 | SYSTOLIC BLOOD PRESSURE: 149 MMHG | HEART RATE: 71 BPM

## 2023-07-11 DIAGNOSIS — N39.0 URINARY TRACT INFECTION, SITE NOT SPECIFIED: ICD-10-CM

## 2023-07-11 PROCEDURE — 99213 OFFICE O/P EST LOW 20 MIN: CPT

## 2023-07-11 NOTE — HISTORY OF PRESENT ILLNESS
[None] : None [FreeTextEntry1] : Ms. Flores is a very pleasant 82 year old woman here today for colovesicular fistula s/p repair.\par She reports feeling well since she was here last.\par Reports that she has been voiding normally since she was here last.\par Denies any hematuria, dysuria or urinary retention.\par Reports increased urinary urgency that is somewhat bothersome.

## 2023-07-11 NOTE — ASSESSMENT
[FreeTextEntry1] : Ms. Flores is a very pleasant 82 year old woman here today for colovesicular fistula s/p repair.\par She reports feeling well since she was here last.\par Reports that she has been voiding normally since she was here last.\par Denies any hematuria, dysuria or urinary retention.\par Reports increased urinary urgency that is somewhat bothersome.\par PVR 19 mL.\par RTO in 3 months to evaluate urgency OAB

## 2023-07-11 NOTE — PHYSICAL EXAM
[General Appearance - Well Developed] : well developed [General Appearance - Well Nourished] : well nourished [Normal Appearance] : normal appearance [Well Groomed] : well groomed [Abdomen Soft] : soft [General Appearance - In No Acute Distress] : no acute distress [Abdomen Tenderness] : non-tender [Costovertebral Angle Tenderness] : no ~M costovertebral angle tenderness [Edema] : no peripheral edema [Respiration, Rhythm And Depth] : normal respiratory rhythm and effort [] : no respiratory distress [Exaggerated Use Of Accessory Muscles For Inspiration] : no accessory muscle use [Oriented To Time, Place, And Person] : oriented to person, place, and time [Affect] : the affect was normal [Mood] : the mood was normal [Not Anxious] : not anxious [Normal Station and Gait] : the gait and station were normal for the patient's age [No Focal Deficits] : no focal deficits [No Palpable Adenopathy] : no palpable adenopathy

## 2023-10-13 ENCOUNTER — APPOINTMENT (OUTPATIENT)
Dept: UROLOGY | Facility: CLINIC | Age: 83
End: 2023-10-13
Payer: MEDICARE

## 2023-10-13 VITALS
OXYGEN SATURATION: 97 % | WEIGHT: 137 LBS | HEIGHT: 59 IN | DIASTOLIC BLOOD PRESSURE: 66 MMHG | TEMPERATURE: 97.3 F | SYSTOLIC BLOOD PRESSURE: 152 MMHG | BODY MASS INDEX: 27.62 KG/M2 | HEART RATE: 78 BPM

## 2023-10-13 PROCEDURE — 99214 OFFICE O/P EST MOD 30 MIN: CPT

## 2023-10-17 LAB
APPEARANCE: CLEAR
BACTERIA: NEGATIVE /HPF
BILIRUBIN URINE: NEGATIVE
BLOOD URINE: NEGATIVE
CAST: 0 /LPF
COLOR: YELLOW
EPITHELIAL CELLS: 1 /HPF
GLUCOSE QUALITATIVE U: NEGATIVE MG/DL
KETONES URINE: NEGATIVE MG/DL
LEUKOCYTE ESTERASE URINE: NEGATIVE
MICROSCOPIC-UA: NORMAL
NITRITE URINE: NEGATIVE
PH URINE: 7
PROTEIN URINE: NEGATIVE MG/DL
RED BLOOD CELLS URINE: 1 /HPF
SPECIFIC GRAVITY URINE: 1.02
UROBILINOGEN URINE: 1 MG/DL
WHITE BLOOD CELLS URINE: 0 /HPF

## 2023-10-17 RX ORDER — AMOXICILLIN AND CLAVULANATE POTASSIUM 875; 125 MG/1; MG/1
875-125 TABLET, COATED ORAL TWICE DAILY
Qty: 6 | Refills: 0 | Status: ACTIVE | COMMUNITY
Start: 2023-10-17 | End: 1900-01-01

## 2023-11-15 ENCOUNTER — APPOINTMENT (OUTPATIENT)
Dept: UROLOGY | Facility: CLINIC | Age: 83
End: 2023-11-15
Payer: MEDICARE

## 2023-11-15 VITALS
OXYGEN SATURATION: 97 % | HEART RATE: 69 BPM | HEIGHT: 59 IN | BODY MASS INDEX: 27.62 KG/M2 | SYSTOLIC BLOOD PRESSURE: 145 MMHG | WEIGHT: 137 LBS | DIASTOLIC BLOOD PRESSURE: 61 MMHG | TEMPERATURE: 98 F

## 2023-11-15 DIAGNOSIS — N32.1 VESICOINTESTINAL FISTULA: ICD-10-CM

## 2023-11-15 DIAGNOSIS — R39.15 URGENCY OF URINATION: ICD-10-CM

## 2023-11-15 PROCEDURE — 99214 OFFICE O/P EST MOD 30 MIN: CPT

## 2023-11-15 RX ORDER — MIRABEGRON 25 MG/1
25 TABLET, FILM COATED, EXTENDED RELEASE ORAL DAILY
Qty: 30 | Refills: 1 | Status: DISCONTINUED | COMMUNITY
Start: 2023-10-13 | End: 2023-11-15

## 2023-11-16 LAB
APPEARANCE: CLEAR
BACTERIA: NEGATIVE /HPF
BILIRUBIN URINE: NEGATIVE
BLOOD URINE: NEGATIVE
CAST: 0 /LPF
COLOR: YELLOW
EPITHELIAL CELLS: 2 /HPF
GLUCOSE QUALITATIVE U: NEGATIVE MG/DL
KETONES URINE: NEGATIVE MG/DL
LEUKOCYTE ESTERASE URINE: NEGATIVE
MICROSCOPIC-UA: NORMAL
NITRITE URINE: NEGATIVE
PH URINE: 7
PROTEIN URINE: NEGATIVE MG/DL
RED BLOOD CELLS URINE: 1 /HPF
SPECIFIC GRAVITY URINE: 1.02
UROBILINOGEN URINE: 1 MG/DL
WHITE BLOOD CELLS URINE: 0 /HPF

## 2023-11-17 LAB — BACTERIA UR CULT: NORMAL

## 2024-05-15 ENCOUNTER — APPOINTMENT (OUTPATIENT)
Dept: UROLOGY | Facility: CLINIC | Age: 84
End: 2024-05-15

## (undated) DEVICE — GOWN XXXL

## (undated) DEVICE — GLV 7.5 PROTEXIS (WHITE)

## (undated) DEVICE — MEDICATION LABELS W MARKER

## (undated) DEVICE — SUT VICRYL 2-0 18" TIES

## (undated) DEVICE — DRAPE 1/2 SHEET 40X57"

## (undated) DEVICE — SOL IRR POUR NS 0.9% 1500ML

## (undated) DEVICE — SOL IRR POUR NS 0.9% 500ML

## (undated) DEVICE — XI STAPLER SUREFORM 60

## (undated) DEVICE — Device

## (undated) DEVICE — SUT CHROMIC 0 30" V-20

## (undated) DEVICE — DRAPE LITHOTOMY PERI-GYN

## (undated) DEVICE — SCOPE WARMER SEAL DISP

## (undated) DEVICE — VENODYNE/SCD SLEEVE CALF MEDIUM

## (undated) DEVICE — NDL HYPO SAFE 30G X .5" (YELLOW)

## (undated) DEVICE — PACK MINOR

## (undated) DEVICE — TAPE SILK 3"

## (undated) DEVICE — BASIN SET SINGLE

## (undated) DEVICE — ELCTR GROUNDING PAD ADULT COVIDIEN

## (undated) DEVICE — SUT POLYSORB 3-0 30" V-20 UNDYED

## (undated) DEVICE — BLADE SCALPEL SAFETYLOCK #15

## (undated) DEVICE — LUBRICATING JELLY ONESHOT 1.25OZ

## (undated) DEVICE — ELCTR BOVIE TIP BLADE INSULATED 6.5" EDGE

## (undated) DEVICE — DRAPE LIGHT HANDLE COVER (BLUE)

## (undated) DEVICE — BIOSEL SIGMOIDOSCOPE KIT DISP

## (undated) DEVICE — TUBING STRYKEFLOW II SUCTION / IRRIGATOR

## (undated) DEVICE — GLV 7 PROTEXIS (WHITE)

## (undated) DEVICE — GOWN XL

## (undated) DEVICE — XI OBTURATOR OPTICAL BLADELESS 8MM

## (undated) DEVICE — POSITIONER FOAM HEAD CRADLE (PINK)

## (undated) DEVICE — PACK PERI GYN

## (undated) DEVICE — LIGASURE EXACT DISSECTOR

## (undated) DEVICE — NDL HYPO REGULAR BEVEL 25G X 1.5" (BLUE)

## (undated) DEVICE — TUBING AIRSEAL TRI-LUMEN FILTERED

## (undated) DEVICE — POSITIONER FOAM EGG CRATE ULNAR 2PCS (PINK)

## (undated) DEVICE — SYR LUER LOK 10CC

## (undated) DEVICE — XI TIP COVER

## (undated) DEVICE — BLADE SCALPEL SAFETYLOCK #10

## (undated) DEVICE — SPECIMEN CONTAINER 100ML

## (undated) DEVICE — DRSG CURITY GAUZE SPONGE 4 X 4" 12-PLY

## (undated) DEVICE — ELCTR BOVIE TIP BLADE INSULATED 2.75" EDGE WITH SAFETY

## (undated) DEVICE — APPLICATOR FOR TISSEEL DUPLOTIP W SNAP LOCK 5MMX32CM

## (undated) DEVICE — DRSG GAUZE VASELINE PETROLEUM 3 X 18"

## (undated) DEVICE — DRSG COMBINE 5X9"

## (undated) DEVICE — PACK MINOR NO DRAPE

## (undated) DEVICE — WARMING BLANKET UPPER ADULT

## (undated) DEVICE — XI DRAPE ARM

## (undated) DEVICE — SOL IRR POUR H2O 250ML

## (undated) DEVICE — DRSG TELFA 3 X 8

## (undated) DEVICE — POSITIONER STRAP ARMBOARD VELCRO TS-30

## (undated) DEVICE — DRAIN PENROSE .25" X 18" LATEX

## (undated) DEVICE — DRAPE LAPAROTOMY W VELCRO CORD TABS

## (undated) DEVICE — DRSG MASTISOL

## (undated) DEVICE — XI SEAL UNIV 5- 8 MM

## (undated) DEVICE — POSITIONER PINK PAD PIGAZZI SYSTEM

## (undated) DEVICE — PREP BETADINE SPONGE STICKS

## (undated) DEVICE — POSITIONER PURPLE ARM ONE STEP (LARGE)

## (undated) DEVICE — WARMING BLANKET FULL ADULT

## (undated) DEVICE — FOLEY TRAY 16FR 5CC LF UMETER CLOSED